# Patient Record
Sex: MALE | Race: WHITE | NOT HISPANIC OR LATINO | URBAN - METROPOLITAN AREA
[De-identification: names, ages, dates, MRNs, and addresses within clinical notes are randomized per-mention and may not be internally consistent; named-entity substitution may affect disease eponyms.]

---

## 2018-12-14 ENCOUNTER — EMERGENCY (EMERGENCY)
Facility: HOSPITAL | Age: 78
LOS: 1 days | Discharge: HOME | End: 2018-12-14
Attending: EMERGENCY MEDICINE | Admitting: EMERGENCY MEDICINE
Payer: MEDICARE

## 2018-12-14 VITALS
OXYGEN SATURATION: 99 % | DIASTOLIC BLOOD PRESSURE: 82 MMHG | HEIGHT: 73 IN | TEMPERATURE: 98 F | SYSTOLIC BLOOD PRESSURE: 171 MMHG | WEIGHT: 244.93 LBS | HEART RATE: 72 BPM | RESPIRATION RATE: 16 BRPM

## 2018-12-14 VITALS
RESPIRATION RATE: 16 BRPM | SYSTOLIC BLOOD PRESSURE: 162 MMHG | OXYGEN SATURATION: 99 % | HEART RATE: 76 BPM | TEMPERATURE: 99 F | DIASTOLIC BLOOD PRESSURE: 79 MMHG

## 2018-12-14 PROCEDURE — 90471 IMMUNIZATION ADMIN: CPT

## 2018-12-14 PROCEDURE — 13133 CMPLX RPR F/C/C/M/N/AX/G/H/F: CPT

## 2018-12-14 PROCEDURE — 99284 EMERGENCY DEPT VISIT MOD MDM: CPT

## 2018-12-14 PROCEDURE — 90715 TDAP VACCINE 7 YRS/> IM: CPT

## 2018-12-14 PROCEDURE — 99285 EMERGENCY DEPT VISIT HI MDM: CPT | Mod: 25

## 2018-12-14 PROCEDURE — 13152 CMPLX RPR E/N/E/L 2.6-7.5 CM: CPT

## 2018-12-14 RX ORDER — TETANUS TOXOID, REDUCED DIPHTHERIA TOXOID AND ACELLULAR PERTUSSIS VACCINE, ADSORBED 5; 2.5; 8; 8; 2.5 [IU]/.5ML; [IU]/.5ML; UG/.5ML; UG/.5ML; UG/.5ML
0.5 SUSPENSION INTRAMUSCULAR ONCE
Qty: 0 | Refills: 0 | Status: COMPLETED | OUTPATIENT
Start: 2018-12-14 | End: 2018-12-14

## 2018-12-14 RX ORDER — OXYCODONE AND ACETAMINOPHEN 5; 325 MG/1; MG/1
1 TABLET ORAL
Qty: 12 | Refills: 0
Start: 2018-12-14 | End: 2018-12-16

## 2018-12-14 RX ADMIN — TETANUS TOXOID, REDUCED DIPHTHERIA TOXOID AND ACELLULAR PERTUSSIS VACCINE, ADSORBED 0.5 MILLILITER(S): 5; 2.5; 8; 8; 2.5 SUSPENSION INTRAMUSCULAR at 17:36

## 2018-12-14 RX ADMIN — Medication 1 TABLET(S): at 19:09

## 2018-12-14 NOTE — ED PROVIDER NOTE - ATTENDING CONTRIBUTION TO CARE
pt c/o laceration to left ear s/p getting of ladder and accidentally hitting ear on ladder. no headache, dizziness, nausea, vomiting, weakness, numbness or any other injury.  left ear approx 2cm laceration to external ear through antihelix and cartilage of ear

## 2018-12-14 NOTE — ED ADULT NURSE NOTE - NSFALLRSKASSESSDT_ED_ALL_ED
You may receive a survey about your visit with us today. The feedback from our patients helps us identify what is working well and where the service to all patients can be enhanced. Thank you!   Soak foot in epsom salt and use a baking soda paste, contact office if erythema spreads
14-Dec-2018 16:36

## 2018-12-14 NOTE — ED PROVIDER NOTE - PROGRESS NOTE DETAILS
plastics consulted due to extensive damage of tissue of ear and cartlage involvment pt seen and evaluated by dr elizabeth, sutured and closed, per dr elizabeth , dc home with augmentin  follow up on wednesday in office or earlier if concern, percocet for pain prn

## 2018-12-14 NOTE — ED PROVIDER NOTE - MEDICAL DECISION MAKING DETAILS
pt with left ear laceration - plastics/tdap pt with left ear laceration - plastics/tdap, augmentin to go home

## 2018-12-14 NOTE — ED PROCEDURE NOTE - PROCEDURE ADDITIONAL DETAILS
devitalize tissue debrided  cartilage repair with 4-0 monocryl  bacitracin ointment applied  augmentin given  shower tomorrow   bacitracin ointment 2-3 x daily   follow up on wednesday

## 2018-12-14 NOTE — ED PROVIDER NOTE - OBJECTIVE STATEMENT
77 y/o male  with mult med problems  presents to the ED with laceration to the left ear x 1 hour  pt states fell from last step of ladder and grabbed ladder when going down,   bottom rung of ladder cause laceration to ear   unsure of last tetanus   denies other complaints or pain

## 2024-03-14 ENCOUNTER — INPATIENT (INPATIENT)
Facility: HOSPITAL | Age: 84
LOS: 1 days | Discharge: HOME | DRG: 309 | End: 2024-03-16
Attending: INTERNAL MEDICINE | Admitting: STUDENT IN AN ORGANIZED HEALTH CARE EDUCATION/TRAINING PROGRAM
Payer: MEDICARE

## 2024-03-14 VITALS
OXYGEN SATURATION: 98 % | DIASTOLIC BLOOD PRESSURE: 79 MMHG | WEIGHT: 231.49 LBS | HEART RATE: 108 BPM | RESPIRATION RATE: 18 BRPM | HEIGHT: 73 IN | TEMPERATURE: 98 F | SYSTOLIC BLOOD PRESSURE: 191 MMHG

## 2024-03-14 LAB
BASOPHILS # BLD AUTO: 0.02 K/UL — SIGNIFICANT CHANGE UP (ref 0–0.2)
BASOPHILS NFR BLD AUTO: 0.4 % — SIGNIFICANT CHANGE UP (ref 0–2)
EOSINOPHIL # BLD AUTO: 0.09 K/UL — SIGNIFICANT CHANGE UP (ref 0–0.5)
EOSINOPHIL NFR BLD AUTO: 1.8 % — SIGNIFICANT CHANGE UP (ref 0–6)
HCT VFR BLD CALC: 28.8 % — LOW (ref 39–50)
HGB BLD-MCNC: 9.5 G/DL — LOW (ref 13–17)
IMM GRANULOCYTES NFR BLD AUTO: 0.4 % — SIGNIFICANT CHANGE UP (ref 0–0.9)
LYMPHOCYTES # BLD AUTO: 1.31 K/UL — SIGNIFICANT CHANGE UP (ref 1–3.3)
LYMPHOCYTES # BLD AUTO: 26.1 % — SIGNIFICANT CHANGE UP (ref 13–44)
MCHC RBC-ENTMCNC: 28.5 PG — SIGNIFICANT CHANGE UP (ref 27–34)
MCHC RBC-ENTMCNC: 33 GM/DL — SIGNIFICANT CHANGE UP (ref 32–36)
MCV RBC AUTO: 86.5 FL — SIGNIFICANT CHANGE UP (ref 80–100)
MONOCYTES # BLD AUTO: 0.7 K/UL — SIGNIFICANT CHANGE UP (ref 0–0.9)
MONOCYTES NFR BLD AUTO: 13.9 % — SIGNIFICANT CHANGE UP (ref 2–14)
NEUTROPHILS # BLD AUTO: 2.88 K/UL — SIGNIFICANT CHANGE UP (ref 1.8–7.4)
NEUTROPHILS NFR BLD AUTO: 57.4 % — SIGNIFICANT CHANGE UP (ref 43–77)
NRBC # BLD: 0 /100 WBCS — SIGNIFICANT CHANGE UP (ref 0–0)
PLATELET # BLD AUTO: 167 K/UL — SIGNIFICANT CHANGE UP (ref 150–400)
RBC # BLD: 3.33 M/UL — LOW (ref 4.2–5.8)
RBC # FLD: 16 % — HIGH (ref 10.3–14.5)
WBC # BLD: 5.02 K/UL — SIGNIFICANT CHANGE UP (ref 3.8–10.5)
WBC # FLD AUTO: 5.02 K/UL — SIGNIFICANT CHANGE UP (ref 3.8–10.5)

## 2024-03-14 PROCEDURE — 99285 EMERGENCY DEPT VISIT HI MDM: CPT

## 2024-03-14 PROCEDURE — 93010 ELECTROCARDIOGRAM REPORT: CPT

## 2024-03-14 RX ORDER — CARVEDILOL PHOSPHATE 80 MG/1
25 CAPSULE, EXTENDED RELEASE ORAL ONCE
Refills: 0 | Status: COMPLETED | OUTPATIENT
Start: 2024-03-14 | End: 2024-03-14

## 2024-03-14 NOTE — ED PROVIDER NOTE - NSICDXPASTMEDICALHX_GEN_ALL_CORE_FT
PAST MEDICAL HISTORY:  Atrial fibrillation     Bladder cancer     HTN (hypertension)     Hypercholesterolemia     Pacemaker

## 2024-03-14 NOTE — ED ADULT TRIAGE NOTE - HEART RATE (BEATS/MIN)
108 Wartpeel Counseling:  I discussed with the patient the risks of Wartpeel including but not limited to erythema, scaling, itching, weeping, crusting, and pain.

## 2024-03-14 NOTE — ED PROVIDER NOTE - CLINICAL SUMMARY MEDICAL DECISION MAKING FREE TEXT BOX
84 y/o M PMH of Afib on Eliquis, SSS s/p PPM, HTN, HLD, Bladder Ca on Keytruda presenting with palpitations  EKG V-paced with occasional native beats  Will dose PM carvedilol  Check screening labs including cardiac enzymes, TSH, Digoxin level  Doubt PE as patient on Eliquis  Reassess. 84 y/o M PMH of Afib on Eliquis, SSS s/p PPM, HTN, HLD, Bladder Ca on Keytruda, Hypothyroidism presenting with palpitations  EKG V-paced with occasional native beats  Will dose PM carvedilol  Check screening labs including cardiac enzymes, TSH, Digoxin level  Doubt PE as patient on Eliquis  Reassess. 84 y/o M PMH of Afib on Eliquis, SSS s/p PPM, HTN, HLD, Bladder Ca on Keytruda, Hypothyroidism, CAD, HFrEF presenting with palpitations  EKG V-paced with occasional native beats  Will dose PM carvedilol  Check screening labs including cardiac enzymes, TSH, Digoxin level  Doubt PE as patient on Eliquis  Reassess.

## 2024-03-14 NOTE — ED PROVIDER NOTE - OBJECTIVE STATEMENT
82 y/o M PMH of Afib on Eliquis, SSS s/p PPM, HTN, HLD, Bladder Ca on Keytruda presenting with palpitations    States that palpitations occurred around 930 with associated nausea and diaphoresis but no chest pain. No fevers/chills or shortness of breath. Received second infusion of Keytruda last Thursday. No vomiting. Since arriving in ER, patient feels that HR has improved. Takes carvedilol 25 mg BID for rate control, but did not take PM dose yet.  Cardiologist: Dr. Beck 84 y/o M PMH of Afib on Eliquis, SSS s/p PPM, HTN, HLD, Bladder Ca on Keytruda, Hypothyroidism presenting with palpitations    States that palpitations occurred around 930 with associated nausea and diaphoresis but no chest pain. No fevers/chills or shortness of breath. Received second infusion of Keytruda last Thursday. No vomiting. Since arriving in ER, patient feels that HR has improved. Takes carvedilol 25 mg BID for rate control, but did not take PM dose yet.  Cardiologist: Dr. Beck 82 y/o M PMH of Afib on Eliquis, SSS s/p PPM, HTN, HLD, Bladder Ca on Keytruda, Hypothyroidism, CAD, HFrEF presenting with palpitations    States that palpitations occurred around 930 with associated nausea and diaphoresis but no chest pain. No fevers/chills or shortness of breath. Received second infusion of Keytruda last Thursday. No vomiting. Since arriving in ER, patient feels that HR has improved. Takes carvedilol 25 mg BID for rate control, but did not take PM dose yet.  Cardiologist: Dr. Beck

## 2024-03-14 NOTE — ED PROVIDER NOTE - INTERPRETATION
occasional native beats/pacemaker: good capture, regular rhythm and appropriate intervals. Afib with V-pacing intermittently/abnormal

## 2024-03-15 DIAGNOSIS — Z29.9 ENCOUNTER FOR PROPHYLACTIC MEASURES, UNSPECIFIED: ICD-10-CM

## 2024-03-15 DIAGNOSIS — I48.91 UNSPECIFIED ATRIAL FIBRILLATION: ICD-10-CM

## 2024-03-15 DIAGNOSIS — E03.9 HYPOTHYROIDISM, UNSPECIFIED: ICD-10-CM

## 2024-03-15 DIAGNOSIS — I25.10 ATHEROSCLEROTIC HEART DISEASE OF NATIVE CORONARY ARTERY WITHOUT ANGINA PECTORIS: ICD-10-CM

## 2024-03-15 DIAGNOSIS — R79.89 OTHER SPECIFIED ABNORMAL FINDINGS OF BLOOD CHEMISTRY: ICD-10-CM

## 2024-03-15 DIAGNOSIS — I50.9 HEART FAILURE, UNSPECIFIED: ICD-10-CM

## 2024-03-15 DIAGNOSIS — I10 ESSENTIAL (PRIMARY) HYPERTENSION: ICD-10-CM

## 2024-03-15 DIAGNOSIS — R00.2 PALPITATIONS: ICD-10-CM

## 2024-03-15 DIAGNOSIS — Z87.438 PERSONAL HISTORY OF OTHER DISEASES OF MALE GENITAL ORGANS: ICD-10-CM

## 2024-03-15 DIAGNOSIS — N17.9 ACUTE KIDNEY FAILURE, UNSPECIFIED: ICD-10-CM

## 2024-03-15 DIAGNOSIS — K21.9 GASTRO-ESOPHAGEAL REFLUX DISEASE WITHOUT ESOPHAGITIS: ICD-10-CM

## 2024-03-15 DIAGNOSIS — C67.9 MALIGNANT NEOPLASM OF BLADDER, UNSPECIFIED: ICD-10-CM

## 2024-03-15 PROBLEM — Z95.0 PRESENCE OF CARDIAC PACEMAKER: Chronic | Status: ACTIVE | Noted: 2018-12-14

## 2024-03-15 PROBLEM — E78.00 PURE HYPERCHOLESTEROLEMIA, UNSPECIFIED: Chronic | Status: ACTIVE | Noted: 2018-12-14

## 2024-03-15 LAB
A1C WITH ESTIMATED AVERAGE GLUCOSE RESULT: 5.7 % — HIGH (ref 4–5.6)
ALBUMIN SERPL ELPH-MCNC: 3 G/DL — LOW (ref 3.3–5)
ALBUMIN SERPL ELPH-MCNC: 3.3 G/DL — SIGNIFICANT CHANGE UP (ref 3.3–5)
ALP SERPL-CCNC: 56 U/L — SIGNIFICANT CHANGE UP (ref 40–120)
ALP SERPL-CCNC: 63 U/L — SIGNIFICANT CHANGE UP (ref 40–120)
ALT FLD-CCNC: 15 U/L — SIGNIFICANT CHANGE UP (ref 12–78)
ALT FLD-CCNC: 15 U/L — SIGNIFICANT CHANGE UP (ref 12–78)
ANION GAP SERPL CALC-SCNC: 10 MMOL/L — SIGNIFICANT CHANGE UP (ref 5–17)
ANION GAP SERPL CALC-SCNC: 9 MMOL/L — SIGNIFICANT CHANGE UP (ref 5–17)
APPEARANCE UR: CLEAR — SIGNIFICANT CHANGE UP
APTT BLD: 34.7 SEC — SIGNIFICANT CHANGE UP (ref 24.5–35.6)
AST SERPL-CCNC: 13 U/L — LOW (ref 15–37)
AST SERPL-CCNC: 21 U/L — SIGNIFICANT CHANGE UP (ref 15–37)
BILIRUB SERPL-MCNC: 0.2 MG/DL — SIGNIFICANT CHANGE UP (ref 0.2–1.2)
BILIRUB SERPL-MCNC: 0.3 MG/DL — SIGNIFICANT CHANGE UP (ref 0.2–1.2)
BILIRUB UR-MCNC: NEGATIVE — SIGNIFICANT CHANGE UP
BUN SERPL-MCNC: 63 MG/DL — HIGH (ref 7–23)
BUN SERPL-MCNC: 67 MG/DL — HIGH (ref 7–23)
CALCIUM SERPL-MCNC: 9 MG/DL — SIGNIFICANT CHANGE UP (ref 8.5–10.1)
CALCIUM SERPL-MCNC: 9 MG/DL — SIGNIFICANT CHANGE UP (ref 8.5–10.1)
CHLORIDE SERPL-SCNC: 107 MMOL/L — SIGNIFICANT CHANGE UP (ref 96–108)
CHLORIDE SERPL-SCNC: 112 MMOL/L — HIGH (ref 96–108)
CHOLEST SERPL-MCNC: 100 MG/DL — SIGNIFICANT CHANGE UP
CK MB BLD-MCNC: 3.5 % — SIGNIFICANT CHANGE UP (ref 0–3.5)
CK MB BLD-MCNC: 4.1 % — HIGH (ref 0–3.5)
CK MB CFR SERPL CALC: 1.7 NG/ML — SIGNIFICANT CHANGE UP (ref 0–3.6)
CK MB CFR SERPL CALC: 2.2 NG/ML — SIGNIFICANT CHANGE UP (ref 0–3.6)
CK SERPL-CCNC: 48 U/L — SIGNIFICANT CHANGE UP (ref 26–308)
CK SERPL-CCNC: 54 U/L — SIGNIFICANT CHANGE UP (ref 26–308)
CO2 SERPL-SCNC: 24 MMOL/L — SIGNIFICANT CHANGE UP (ref 22–31)
CO2 SERPL-SCNC: 25 MMOL/L — SIGNIFICANT CHANGE UP (ref 22–31)
COLOR SPEC: YELLOW — SIGNIFICANT CHANGE UP
CREAT SERPL-MCNC: 2.2 MG/DL — HIGH (ref 0.5–1.3)
CREAT SERPL-MCNC: 2.2 MG/DL — HIGH (ref 0.5–1.3)
DIFF PNL FLD: ABNORMAL
DIGOXIN SERPL-MCNC: 0.4 NG/ML — LOW (ref 0.8–2)
EGFR: 29 ML/MIN/1.73M2 — LOW
EGFR: 29 ML/MIN/1.73M2 — LOW
ESTIMATED AVERAGE GLUCOSE: 117 MG/DL — HIGH (ref 68–114)
GLUCOSE SERPL-MCNC: 90 MG/DL — SIGNIFICANT CHANGE UP (ref 70–99)
GLUCOSE SERPL-MCNC: 99 MG/DL — SIGNIFICANT CHANGE UP (ref 70–99)
GLUCOSE UR QL: NEGATIVE MG/DL — SIGNIFICANT CHANGE UP
HCT VFR BLD CALC: 26.8 % — LOW (ref 39–50)
HDLC SERPL-MCNC: 40 MG/DL — LOW
HGB BLD-MCNC: 8.7 G/DL — LOW (ref 13–17)
INR BLD: 1.19 RATIO — HIGH (ref 0.85–1.18)
KETONES UR-MCNC: NEGATIVE MG/DL — SIGNIFICANT CHANGE UP
LEUKOCYTE ESTERASE UR-ACNC: ABNORMAL
LIPID PNL WITH DIRECT LDL SERPL: 43 MG/DL — SIGNIFICANT CHANGE UP
MAGNESIUM SERPL-MCNC: 2 MG/DL — SIGNIFICANT CHANGE UP (ref 1.6–2.6)
MCHC RBC-ENTMCNC: 28.2 PG — SIGNIFICANT CHANGE UP (ref 27–34)
MCHC RBC-ENTMCNC: 32.5 GM/DL — SIGNIFICANT CHANGE UP (ref 32–36)
MCV RBC AUTO: 87 FL — SIGNIFICANT CHANGE UP (ref 80–100)
NITRITE UR-MCNC: NEGATIVE — SIGNIFICANT CHANGE UP
NON HDL CHOLESTEROL: 60 MG/DL — SIGNIFICANT CHANGE UP
NRBC # BLD: 0 /100 WBCS — SIGNIFICANT CHANGE UP (ref 0–0)
PH UR: 5 — SIGNIFICANT CHANGE UP (ref 5–8)
PHOSPHATE SERPL-MCNC: 3.6 MG/DL — SIGNIFICANT CHANGE UP (ref 2.5–4.5)
PLATELET # BLD AUTO: 147 K/UL — LOW (ref 150–400)
POTASSIUM SERPL-MCNC: 4.3 MMOL/L — SIGNIFICANT CHANGE UP (ref 3.5–5.3)
POTASSIUM SERPL-MCNC: 4.4 MMOL/L — SIGNIFICANT CHANGE UP (ref 3.5–5.3)
POTASSIUM SERPL-SCNC: 4.3 MMOL/L — SIGNIFICANT CHANGE UP (ref 3.5–5.3)
POTASSIUM SERPL-SCNC: 4.4 MMOL/L — SIGNIFICANT CHANGE UP (ref 3.5–5.3)
PROT SERPL-MCNC: 6.5 G/DL — SIGNIFICANT CHANGE UP (ref 6–8.3)
PROT SERPL-MCNC: 7.1 G/DL — SIGNIFICANT CHANGE UP (ref 6–8.3)
PROT UR-MCNC: NEGATIVE MG/DL — SIGNIFICANT CHANGE UP
PROTHROM AB SERPL-ACNC: 13.8 SEC — HIGH (ref 9.5–13)
RBC # BLD: 3.08 M/UL — LOW (ref 4.2–5.8)
RBC # FLD: 16.1 % — HIGH (ref 10.3–14.5)
SODIUM SERPL-SCNC: 141 MMOL/L — SIGNIFICANT CHANGE UP (ref 135–145)
SODIUM SERPL-SCNC: 146 MMOL/L — HIGH (ref 135–145)
SP GR SPEC: 1.01 — SIGNIFICANT CHANGE UP (ref 1–1.03)
T3FREE SERPL-MCNC: 3.6 PG/ML — SIGNIFICANT CHANGE UP (ref 2–4.4)
TRIGL SERPL-MCNC: 87 MG/DL — SIGNIFICANT CHANGE UP
TROPONIN I, HIGH SENSITIVITY RESULT: 225.7 NG/L — HIGH
TROPONIN I, HIGH SENSITIVITY RESULT: 272 NG/L — HIGH
TROPONIN I, HIGH SENSITIVITY RESULT: 292.5 NG/L — HIGH
TSH SERPL-MCNC: 0.02 UIU/ML — LOW (ref 0.36–3.74)
UROBILINOGEN FLD QL: 0.2 MG/DL — SIGNIFICANT CHANGE UP (ref 0.2–1)
WBC # BLD: 5.45 K/UL — SIGNIFICANT CHANGE UP (ref 3.8–10.5)
WBC # FLD AUTO: 5.45 K/UL — SIGNIFICANT CHANGE UP (ref 3.8–10.5)

## 2024-03-15 PROCEDURE — 99223 1ST HOSP IP/OBS HIGH 75: CPT

## 2024-03-15 RX ORDER — POTASSIUM BICARBONATE 978 MG/1
1 TABLET, EFFERVESCENT ORAL
Refills: 0 | DISCHARGE

## 2024-03-15 RX ORDER — DIGOXIN 250 MCG
0 TABLET ORAL
Qty: 0 | Refills: 0 | DISCHARGE

## 2024-03-15 RX ORDER — ROSUVASTATIN CALCIUM 5 MG/1
1 TABLET ORAL
Qty: 0 | Refills: 0 | DISCHARGE

## 2024-03-15 RX ORDER — RANITIDINE HYDROCHLORIDE 150 MG/1
0 TABLET, FILM COATED ORAL
Qty: 0 | Refills: 0 | DISCHARGE

## 2024-03-15 RX ORDER — SODIUM CHLORIDE 9 MG/ML
1000 INJECTION, SOLUTION INTRAVENOUS
Refills: 0 | Status: DISCONTINUED | OUTPATIENT
Start: 2024-03-15 | End: 2024-03-16

## 2024-03-15 RX ORDER — AMLODIPINE BESYLATE 2.5 MG/1
10 TABLET ORAL DAILY
Refills: 0 | Status: DISCONTINUED | OUTPATIENT
Start: 2024-03-15 | End: 2024-03-15

## 2024-03-15 RX ORDER — EZETIMIBE 10 MG/1
1 TABLET ORAL
Qty: 0 | Refills: 0 | DISCHARGE

## 2024-03-15 RX ORDER — CARVEDILOL PHOSPHATE 80 MG/1
6.25 CAPSULE, EXTENDED RELEASE ORAL EVERY 12 HOURS
Refills: 0 | Status: DISCONTINUED | OUTPATIENT
Start: 2024-03-15 | End: 2024-03-15

## 2024-03-15 RX ORDER — ASPIRIN/CALCIUM CARB/MAGNESIUM 324 MG
1 TABLET ORAL
Refills: 0 | DISCHARGE

## 2024-03-15 RX ORDER — APIXABAN 2.5 MG/1
0 TABLET, FILM COATED ORAL
Qty: 0 | Refills: 0 | DISCHARGE

## 2024-03-15 RX ORDER — CARVEDILOL PHOSPHATE 80 MG/1
25 CAPSULE, EXTENDED RELEASE ORAL EVERY 12 HOURS
Refills: 0 | Status: DISCONTINUED | OUTPATIENT
Start: 2024-03-15 | End: 2024-03-16

## 2024-03-15 RX ORDER — CALCITRIOL 0.5 UG/1
1 CAPSULE ORAL
Qty: 0 | Refills: 0 | DISCHARGE

## 2024-03-15 RX ORDER — AMIODARONE HYDROCHLORIDE 400 MG/1
2 TABLET ORAL
Refills: 0 | DISCHARGE

## 2024-03-15 RX ORDER — FINASTERIDE 5 MG/1
1 TABLET, FILM COATED ORAL
Refills: 0 | DISCHARGE

## 2024-03-15 RX ORDER — ASPIRIN/CALCIUM CARB/MAGNESIUM 324 MG
1 TABLET ORAL
Qty: 0 | Refills: 0 | DISCHARGE

## 2024-03-15 RX ORDER — LEVOTHYROXINE SODIUM 125 MCG
100 TABLET ORAL DAILY
Refills: 0 | Status: DISCONTINUED | OUTPATIENT
Start: 2024-03-15 | End: 2024-03-16

## 2024-03-15 RX ORDER — DIGOXIN 250 MCG
125 TABLET ORAL
Refills: 0 | Status: DISCONTINUED | OUTPATIENT
Start: 2024-03-15 | End: 2024-03-16

## 2024-03-15 RX ORDER — AMIODARONE HYDROCHLORIDE 400 MG/1
100 TABLET ORAL DAILY
Refills: 0 | Status: DISCONTINUED | OUTPATIENT
Start: 2024-03-15 | End: 2024-03-16

## 2024-03-15 RX ORDER — PEMBROLIZUMAB 25 MG/ML
200 INJECTION, SOLUTION INTRAVENOUS
Refills: 0 | DISCHARGE

## 2024-03-15 RX ORDER — LEVOTHYROXINE SODIUM 125 MCG
1 TABLET ORAL
Qty: 0 | Refills: 0 | DISCHARGE

## 2024-03-15 RX ORDER — ASPIRIN/CALCIUM CARB/MAGNESIUM 324 MG
324 TABLET ORAL ONCE
Refills: 0 | Status: COMPLETED | OUTPATIENT
Start: 2024-03-15 | End: 2024-03-15

## 2024-03-15 RX ORDER — AMIODARONE HYDROCHLORIDE 400 MG/1
1 TABLET ORAL
Refills: 0 | DISCHARGE

## 2024-03-15 RX ORDER — HYDROCHLOROTHIAZIDE 25 MG
0 TABLET ORAL
Qty: 0 | Refills: 0 | DISCHARGE

## 2024-03-15 RX ORDER — FUROSEMIDE 40 MG
1 TABLET ORAL
Refills: 0 | DISCHARGE

## 2024-03-15 RX ORDER — ASPIRIN/CALCIUM CARB/MAGNESIUM 324 MG
81 TABLET ORAL DAILY
Refills: 0 | Status: DISCONTINUED | OUTPATIENT
Start: 2024-03-15 | End: 2024-03-16

## 2024-03-15 RX ORDER — CARVEDILOL PHOSPHATE 80 MG/1
1 CAPSULE, EXTENDED RELEASE ORAL
Refills: 0 | DISCHARGE

## 2024-03-15 RX ORDER — SACUBITRIL AND VALSARTAN 24; 26 MG/1; MG/1
1 TABLET, FILM COATED ORAL
Refills: 0 | DISCHARGE

## 2024-03-15 RX ORDER — AMLODIPINE BESYLATE 2.5 MG/1
10 TABLET ORAL DAILY
Refills: 0 | Status: DISCONTINUED | OUTPATIENT
Start: 2024-03-15 | End: 2024-03-16

## 2024-03-15 RX ORDER — PANTOPRAZOLE SODIUM 20 MG/1
40 TABLET, DELAYED RELEASE ORAL
Refills: 0 | Status: DISCONTINUED | OUTPATIENT
Start: 2024-03-15 | End: 2024-03-16

## 2024-03-15 RX ORDER — CALCITRIOL 0.5 UG/1
0.25 CAPSULE ORAL DAILY
Refills: 0 | Status: DISCONTINUED | OUTPATIENT
Start: 2024-03-15 | End: 2024-03-16

## 2024-03-15 RX ORDER — ATORVASTATIN CALCIUM 80 MG/1
40 TABLET, FILM COATED ORAL AT BEDTIME
Refills: 0 | Status: DISCONTINUED | OUTPATIENT
Start: 2024-03-15 | End: 2024-03-16

## 2024-03-15 RX ORDER — APIXABAN 2.5 MG/1
5 TABLET, FILM COATED ORAL
Refills: 0 | Status: DISCONTINUED | OUTPATIENT
Start: 2024-03-15 | End: 2024-03-16

## 2024-03-15 RX ORDER — BENAZEPRIL HYDROCHLORIDE 40 MG/1
1 TABLET ORAL
Qty: 0 | Refills: 0 | DISCHARGE

## 2024-03-15 RX ORDER — APIXABAN 2.5 MG/1
1 TABLET, FILM COATED ORAL
Refills: 0 | DISCHARGE

## 2024-03-15 RX ORDER — DIGOXIN 250 MCG
1 TABLET ORAL
Refills: 0 | DISCHARGE

## 2024-03-15 RX ORDER — CARVEDILOL PHOSPHATE 80 MG/1
0 CAPSULE, EXTENDED RELEASE ORAL
Qty: 0 | Refills: 0 | DISCHARGE

## 2024-03-15 RX ORDER — CALCITRIOL 0.5 UG/1
1 CAPSULE ORAL
Refills: 0 | DISCHARGE

## 2024-03-15 RX ORDER — ESOMEPRAZOLE MAGNESIUM 40 MG/1
1 CAPSULE, DELAYED RELEASE ORAL
Refills: 0 | DISCHARGE

## 2024-03-15 RX ORDER — AMLODIPINE BESYLATE 2.5 MG/1
1 TABLET ORAL
Qty: 0 | Refills: 0 | DISCHARGE

## 2024-03-15 RX ORDER — AMIODARONE HYDROCHLORIDE 400 MG/1
1 TABLET ORAL
Qty: 0 | Refills: 0 | DISCHARGE

## 2024-03-15 RX ORDER — FINASTERIDE 5 MG/1
5 TABLET, FILM COATED ORAL DAILY
Refills: 0 | Status: DISCONTINUED | OUTPATIENT
Start: 2024-03-15 | End: 2024-03-16

## 2024-03-15 RX ADMIN — AMIODARONE HYDROCHLORIDE 100 MILLIGRAM(S): 400 TABLET ORAL at 06:07

## 2024-03-15 RX ADMIN — APIXABAN 5 MILLIGRAM(S): 2.5 TABLET, FILM COATED ORAL at 06:07

## 2024-03-15 RX ADMIN — CALCITRIOL 0.25 MICROGRAM(S): 0.5 CAPSULE ORAL at 12:26

## 2024-03-15 RX ADMIN — Medication 81 MILLIGRAM(S): at 12:26

## 2024-03-15 RX ADMIN — Medication 324 MILLIGRAM(S): at 00:27

## 2024-03-15 RX ADMIN — SODIUM CHLORIDE 75 MILLILITER(S): 9 INJECTION, SOLUTION INTRAVENOUS at 17:16

## 2024-03-15 RX ADMIN — PANTOPRAZOLE SODIUM 40 MILLIGRAM(S): 20 TABLET, DELAYED RELEASE ORAL at 12:26

## 2024-03-15 RX ADMIN — CARVEDILOL PHOSPHATE 25 MILLIGRAM(S): 80 CAPSULE, EXTENDED RELEASE ORAL at 17:15

## 2024-03-15 RX ADMIN — CARVEDILOL PHOSPHATE 25 MILLIGRAM(S): 80 CAPSULE, EXTENDED RELEASE ORAL at 06:06

## 2024-03-15 RX ADMIN — AMLODIPINE BESYLATE 10 MILLIGRAM(S): 2.5 TABLET ORAL at 06:08

## 2024-03-15 RX ADMIN — ATORVASTATIN CALCIUM 40 MILLIGRAM(S): 80 TABLET, FILM COATED ORAL at 21:10

## 2024-03-15 RX ADMIN — Medication 100 MICROGRAM(S): at 06:07

## 2024-03-15 RX ADMIN — APIXABAN 5 MILLIGRAM(S): 2.5 TABLET, FILM COATED ORAL at 17:15

## 2024-03-15 RX ADMIN — CARVEDILOL PHOSPHATE 25 MILLIGRAM(S): 80 CAPSULE, EXTENDED RELEASE ORAL at 00:12

## 2024-03-15 RX ADMIN — Medication 125 MICROGRAM(S): at 06:26

## 2024-03-15 RX ADMIN — FINASTERIDE 5 MILLIGRAM(S): 5 TABLET, FILM COATED ORAL at 12:26

## 2024-03-15 NOTE — CARE COORDINATION ASSESSMENT. - NSDCPLANSERVICES_GEN_ALL_CORE
CM spoke with patient to discuss home care agency choices. Patient undecided at this time. Resource folder left at bedside. CM to follow up./Anticipated Needs Unclear at Present/[facility], Home

## 2024-03-15 NOTE — CONSULT NOTE ADULT - ASSESSMENT
SHARRON on CKD 3  Hypernatremia  HTN  Afib     -Baseline Creatinine 1.8 per patient  -SHARRON likely 2/2 dehydration, accidentally took extra dose of lasix   -Check urine lytes  -Check UA  -Start gentle hypotonic IVF  -Check mag/phos in AM  -Hold lasix for now    d/w primary    3/15/24-D/w wife

## 2024-03-15 NOTE — DISCHARGE NOTE PROVIDER - HOSPITAL COURSE
HPI:  84 yo M w/ PMHX of NSTEMI, RCA stent, LV systolic dysfunction, labile hypertension, PAF, PPM and h/o AAA repair w/ repair in sept 2019, bladdercancer on Keytruda, Hypothyroidism, HLD presents to the ED w/ palpitations that started at 9pm last night. He also admitted to feeling nauseous at the same time he was having palpitations. He presented to the hospital and his HR was noted to be in the 100s. EKG showed ventricular paced with HR of 70 and trops were elevated but ekg showed no ischemic changes and denies chest pain.     In the ED,   Vitals: T: 97.9, HR: 108, BP: 191/79, RR: 18, O2: 98% on RA   EKG: Ventricular Paced HR 70 bpm   Significant labs: Hgb: 9.5, INR: 1.19, Trops 292-->272, Cr/BUN: 67/2.20, CPK: 4.1, Digoxin: 0.4   Imaging: None   Given: ASA 324mg PO x1, Coreg 25mg PO OD  (15 Mar 2024 01:34)      ---  HOSPITAL COURSE: Patient admitted to medicine floor for management of arrythmia workup.    Trops were trended to peak.     Pt seen and examined on day of discharge. Patient is medically optimized for discharge to home with close outpatient followup.    PHYSICAL EXAM ON DAY OF DISCHARGE:  The patient was seen and examined on the day of discharge. Please see progress note from day of discharge for further information.         ---  CONSULTANTS:   Cards: Ebony    ---  TIME SPENT:  I, the attending physician, was physically present for the key portions of the evaluation and management (E/M) service provided. The total amount of time spent reviewing the hospital notes, laboratory values, imaging findings, assessing/counseling the patient, discussing with consultant physicians, social work, nursing staff was -- minutes    ---  Primary care provider was made aware of plan for discharge:      [  ] NO     [  ] YES   HPI:  82 yo M w/ PMHX of NSTEMI, RCA stent, LV systolic dysfunction, labile hypertension, PAF, PPM and h/o AAA repair w/ repair in sept 2019, bladdercancer on Keytruda, Hypothyroidism, HLD presents to the ED w/ palpitations that started at 9pm last night. He also admitted to feeling nauseous at the same time he was having palpitations. He presented to the hospital and his HR was noted to be in the 100s. EKG showed ventricular paced with HR of 70 and trops were elevated but ekg showed no ischemic changes and denies chest pain.     In the ED,   Vitals: T: 97.9, HR: 108, BP: 191/79, RR: 18, O2: 98% on RA   EKG: Ventricular Paced HR 70 bpm   Significant labs: Hgb: 9.5, INR: 1.19, Trops 292-->272, Cr/BUN: 67/2.20, CPK: 4.1, Digoxin: 0.4   Imaging: None   Given: ASA 324mg PO x1, Coreg 25mg PO OD  (15 Mar 2024 01:34)      ---  HOSPITAL COURSE: Patient admitted to medicine floor for management of arrythmia workup. Cardiology consulted. PPM interrogated to be functioning normally without recorded VTach events. TSH was noted to be low, T4 elevated, T3 normal. Endocrinology was consulted and suggested thyrotoxicosis from levothyroxine or amiodarone-induced thyroid dysfunction.     Trops were trended to peak.     Pt seen and examined on day of discharge. Patient is medically optimized for discharge to home with close outpatient followup.    PHYSICAL EXAM ON DAY OF DISCHARGE:  The patient was seen and examined on the day of discharge. Please see progress note from day of discharge for further information.         ---  CONSULTANTS:   Cards: Ebony    ---  TIME SPENT:  I, the attending physician, was physically present for the key portions of the evaluation and management (E/M) service provided. The total amount of time spent reviewing the hospital notes, laboratory values, imaging findings, assessing/counseling the patient, discussing with consultant physicians, social work, nursing staff was -- minutes    ---  Primary care provider was made aware of plan for discharge:      [  ] NO     [  ] YES   HPI:  84 yo M w/ PMHX of NSTEMI, RCA stent, LV systolic dysfunction, labile hypertension, PAF, PPM and h/o AAA repair w/ repair in sept 2019, bladdercancer on Keytruda, Hypothyroidism, HLD presents to the ED w/ palpitations that started at 9pm last night. He also admitted to feeling nauseous at the same time he was having palpitations. He presented to the hospital and his HR was noted to be in the 100s. EKG showed ventricular paced with HR of 70 and trops were elevated but ekg showed no ischemic changes and denies chest pain.     In the ED,   Vitals: T: 97.9, HR: 108, BP: 191/79, RR: 18, O2: 98% on RA   EKG: Ventricular Paced HR 70 bpm   Significant labs: Hgb: 9.5, INR: 1.19, Trops 292-->272, Cr/BUN: 67/2.20, CPK: 4.1, Digoxin: 0.4   Imaging: None   Given: ASA 324mg PO x1, Coreg 25mg PO OD  (15 Mar 2024 01:34)      ---  HOSPITAL COURSE: Patient admitted to medicine floor for management of arrythmia workup. Cardiology consulted. PPM interrogated to be functioning normally without recorded VTach events. Trops were trended to peak, likely elevated 2/2 demand ischemia from Afib.  TSH was noted to be low, T4 elevated, T3 normal. Endocrinology was consulted and believed this was thyrotoxicosis from levothyroxine or amiodarone. Levothyroxine was discontinued, and he was advised to follow-up outpatient. Cardiology recommended discontinuing Amiodarone but patient wants to discuss with outpatient Cardiologist, Dr. Ebony chino.     Pt seen and examined on day of discharge. Patient is medically optimized for discharge to home with close outpatient followup.    PHYSICAL EXAM ON DAY OF DISCHARGE:    T(C): 36.8 (03-16-24 @ 11:42), Max: 36.8 (03-16-24 @ 11:42)  HR: 68 (03-16-24 @ 11:42) (68 - 73)  BP: 160/54 (03-16-24 @ 11:42) (157/77 - 177/71)  RR: 18 (03-16-24 @ 11:42) (18 - 18)  SpO2: 95% (03-16-24 @ 11:42) (92% - 95%)  Patient On (Oxygen Delivery Method): room air    GENERAL: patient appears well, no acute distress  EYES: sclera clear, no exudates  ENMT: oropharynx clear without erythema, no exudates, moist mucous membranes  NECK: supple, soft, no thyromegaly noted  LUNGS: good air entry bilaterally, clear to auscultation, symmetric breath sounds, no wheezing or rhonchi appreciated  HEART: +irregular rhythm, S1/S2, regular rate, no murmurs noted, no lower extremity edema  GASTROINTESTINAL: abdomen is soft, nontender, nondistended, normoactive bowel sounds, no palpable masses  INTEGUMENT: good skin turgor, no lesions noted  MUSCULOSKELETAL: no clubbing or cyanosis, no obvious deformity  NEUROLOGIC: awake, alert, oriented x3, good muscle tone in 4 extremities, no obvious sensory deficits      ---  CONSULTANTS:   Cardiology: Dr. Blevins   Nephrology: Dr. Shaikh  Endocrinology: Dr. Perlman    ---  TIME SPENT:  I, the attending physician, was physically present for the key portions of the evaluation and management (E/M) service provided. The total amount of time spent reviewing the hospital notes, laboratory values, imaging findings, assessing/counseling the patient, discussing with consultant physicians, social work, nursing staff was -- minutes    ---  Primary care provider was made aware of plan for discharge:      [  ] NO     [  ] YES   HPI:  84 yo M w/ PMHX of NSTEMI, RCA stent, LV systolic dysfunction, labile hypertension, PAF, PPM and h/o AAA repair w/ repair in sept 2019, bladdercancer on Keytruda, Hypothyroidism, HLD presents to the ED w/ palpitations that started at 9pm last night. He also admitted to feeling nauseous at the same time he was having palpitations. He presented to the hospital and his HR was noted to be in the 100s. EKG showed ventricular paced with HR of 70 and trops were elevated but ekg showed no ischemic changes and denies chest pain.     In the ED,   Vitals: T: 97.9, HR: 108, BP: 191/79, RR: 18, O2: 98% on RA   EKG: Ventricular Paced HR 70 bpm   Significant labs: Hgb: 9.5, INR: 1.19, Trops 292-->272, Cr/BUN: 67/2.20, CPK: 4.1, Digoxin: 0.4   Imaging: None   Given: ASA 324mg PO x1, Coreg 25mg PO OD  (15 Mar 2024 01:34)      ---  HOSPITAL COURSE: Patient admitted to medicine floor for management of arrythmia workup. Cardiology consulted. PPM interrogated to be functioning normally without recorded VTach events. Trops were trended to peak, likely elevated 2/2 demand ischemia from Afib.  TSH was noted to be low, T4 elevated, T3 normal. Endocrinology was consulted and believed this was thyrotoxicosis from levothyroxine or amiodarone. Levothyroxine was discontinued, and he was advised to follow-up outpatient. Cardiology recommended discontinuing Amiodarone but patient wants to discuss with outpatient Cardiologist, Dr. Ebony chino. Call was placed to covering cardio service 3/16 regarding TTE report, awaiting call back. Patient preferred to go home prior to the TTE results and follow up for this and further management with his outpatient Cardiologist.    Pt seen and examined on day of discharge. Patient is medically optimized for discharge to home with close outpatient followup.    ---  CONSULTANTS:   Cardiology: Dr. Belvins   Nephrology: Dr. Shaikh  Endocrinology: Dr. Perlman

## 2024-03-15 NOTE — H&P ADULT - PROBLEM SELECTOR PLAN 1
- EKG ventricular paced and no ischemic changes   - Trops 292 --> 272 --> continue to trend   - S/P ASA 324mg PO OD   - Continue ASA 81mg po   - TTE ordered, f/u   - Keep on tele   - Cardiology (Dr. Beck) consulted, f/u recs

## 2024-03-15 NOTE — PROGRESS NOTE ADULT - PROBLEM SELECTOR PLAN 2
- EKG ventricular paced and no ischemic changes    - Continue home Eliquis 5mg BID   - Continue home Amiodarone, Digoxin - EKG ventricular paced and no ischemic changes    - Continue home Eliquis 5mg BID   - Continue home Amiodarone, Digoxin  - digoxin level 0.4 - hx of paroxysmal afib; EKG ventricular paced and no acute ischemic changes    - Continue home Eliquis 5mg BID   - Continue home Amiodarone 100mg po daily, Digoxin MWF dosing  - c/w coreg 25mg po BID  - digoxin level 0.4  - of note, TSH is low at 0.02; free T3 of 3.6 ---- f/up free T4 and total T3  - pt states that his cardiology team had told him the amio may be affecting his thyroid function in the past but decided to continue --- f/up with cardio and endo whether change needs to be made  - Cardiology (Dr. Beck / Dr. Blevins group), recs appreciated  - will have PPM interrogation to see what type of arrhythmia pt was having when he had the palpitations at home

## 2024-03-15 NOTE — PATIENT PROFILE ADULT - FALL HARM RISK - FACTORS NURSING JUDGEMENT
How to Quarantine at Home  Information for Patients and Families    These instructions are for people with confirmed or suspected COVID-19 who do not need to be hospitalized and those with confirmed COVID-19 who were hospitalized and discharged to care for themselves at home.    If you were tested through the Health Department  The Health Department will monitor your wellbeing.  If it is determined that you do not need to be hospitalized and can be isolated at home, you will be monitored by staff from your local or state health department.     If you were tested through a Commercial Lab  You will need to monitor yourself and report changes in your symptoms to your doctor.  See the section below called Monitor Your Symptoms.    Follow these steps until a healthcare provider or local or state health department says you can return to your normal activities.    Stay home except to get medical care  Restrict activities outside your home, except for getting medical care.   Do not go to work, school, or public areas.   Avoid using public transportation, ride-sharing, or taxis.    Separate yourself from other people and animals in your home  People  As much as possible, you should stay in a specific room and away from other people in your home. Also, you should use a separate bathroom, if available.    Animals  You should restrict contact with pets and other animals while you are sick with COVID-19, just like you would around other people. When possible, have another member of your household care for your animals while you are sick. If you are sick with COVID-19, avoid contact with your pet, including petting, snuggling, being kissed or licked, and sharing food. If you must care for your pet or be around animals while you are sick, wash your hands before and after you interact with pets and wear a facemask. See COVID-19 and Animals for more information.    Call ahead before visiting your doctor  If you have a medical  appointment, call the healthcare provider and tell them that you have or may have COVID-19. This information will help the healthcare provider’s office take steps to keep other people from getting infected or exposed.    Wear a facemask  You should wear a facemask when you are around other people (e.g., sharing a room or vehicle) or pets and before you enter a healthcare provider’s office.     If you are not able to wear a facemask (for example, because it causes trouble breathing), then people who live with you should not stay in the same room with you, or they should wear a facemask if they enter your room.    Cover your coughs and sneezes  Cover your mouth and nose with a tissue when you cough or sneeze.   Throw used tissues in a lined trash can.   Immediately wash your hands with soap and water for at least 20 seconds or, if soap and water are not available, clean your hands with an alcohol-based hand  that contains at least 60% alcohol.    Clean your hands often  Wash your hands often with soap and water for at least 20 seconds, especially after blowing your nose, coughing, or sneezing; going to the bathroom; and before eating or preparing food.     If soap and water are not readily available, use an alcohol-based hand  with at least 60% alcohol, covering all surfaces of your hands and rubbing them together until they feel dry.    Soap and water are the best option if hands are visibly dirty. Avoid touching your eyes, nose, and mouth with unwashed hands.    Avoid sharing personal household items  You should not share dishes, drinking glasses, cups, eating utensils, towels, or bedding with other people or pets in your home.   After using these items, they should be washed thoroughly with soap and water.    Clean all “high-touch” surfaces everyday  High touch surfaces include counters, tabletops, doorknobs, bathroom fixtures, toilets, phones, keyboards, tablets, and bedside tables.   Also, clean  any surfaces that may have blood, stool, or body fluids on them.   Use a household cleaning spray or wipe, according to the label instructions. Labels contain instructions for safe and effective use of the cleaning product, including precautions you should take when applying the product, such as wearing gloves and making sure you have good ventilation during use of the product.    Monitor your symptoms  Seek prompt medical attention if your illness is worsening (e.g., difficulty breathing).   Before seeking care, call your healthcare provider and tell them that you have, or are being evaluated for, COVID-19.   Put on a facemask before you enter the facility.     These steps will help the healthcare provider’s office to keep other people in the office or waiting room from getting infected or exposed.   Persons who are placed under active monitoring or facilitated self-monitoring should follow instructions provided by their local health department or occupational health professionals, as appropriate.  If you have a medical emergency and need to call 911, notify the dispatch personnel that you have, or are being evaluated for COVID-19. If possible, put on a facemask before emergency medical services arrive.    Discontinuing home isolation  Patients with confirmed COVID-19 should remain under home isolation precautions until the risk of secondary transmission to others is thought to be low. The decision to discontinue home isolation precautions should be made on a case-by-case basis, in consultation with healthcare providers and state and local health departments.    The below content are for household members, intimate partners, and caregivers of a patient with symptomatic laboratory-confirmed COVID-19 or a patient under investigation:    Household members, intimate partners, and caregivers may have close contact with a person with symptomatic, laboratory-confirmed COVID-19 or a person under investigation.     Close  contacts should monitor their health; they should call their healthcare provider right away if they develop symptoms suggestive of COVID-19 (e.g., fever, cough, shortness of breath)     Close contacts should also follow these recommendations:  Make sure that you understand and can help the patient follow their healthcare provider’s instructions for medication(s) and care. You should help the patient with basic needs in the home and provide support for getting groceries, prescriptions, and other personal needs.  Monitor the patient’s symptoms. If the patient is getting sicker, call his or her healthcare provider and tell them that the patient has laboratory-confirmed COVID-19. This will help the healthcare provider’s office take steps to keep other people in the office or waiting room from getting infected. Ask the healthcare provider to call the local or [facility] health department for additional guidance. If the patient has a medical emergency and you need to call 911, notify the dispatch personnel that the patient has, or is being evaluated for COVID-19.  Household members should stay in another room or be  from the patient as much as possible. Household members should use a separate bedroom and bathroom, if available.  Prohibit visitors who do not have an essential need to be in the home.  Household members should care for any pets in the home. Do not handle pets or other animals while sick.  For more information, see COVID-19 and Animals.  Make sure that shared spaces in the home have good air flow, such as by an air conditioner or an opened window, weather permitting.  Perform hand hygiene frequently. Wash your hands often with soap and water for at least 20 seconds or use an alcohol-based hand  that contains 60 to 95% alcohol, covering all surfaces of your hands and rubbing them together until they feel dry. Soap and water should be used preferentially if hands are visibly dirty.  Avoid touching  your eyes, nose, and mouth with unwashed hands.  The patient should wear a facemask when you are around other people. If the patient is not able to wear a facemask (for example, because it causes trouble breathing), you, as the caregiver, should wear a mask when you are in the same room as the patient.  Wear a disposable facemask and gloves when you touch or have contact with the patient’s blood, stool, or body fluids, such as saliva, sputum, nasal mucus, vomit, or urine.   Throw out disposable facemasks and gloves after using them. Do not reuse.  When removing personal protective equipment, first remove and dispose of gloves. Then, immediately clean your hands with soap and water or alcohol-based hand . Next, remove and dispose of facemask, and immediately clean your hands again with soap and water or alcohol-based hand .  Avoid sharing household items with the patient. You should not share dishes, drinking glasses, cups, eating utensils, towels, bedding, or other items. After the patient uses these items, you should wash them thoroughly (see below “Wash laundry thoroughly”).  Clean all “high-touch” surfaces, such as counters, tabletops, doorknobs, bathroom fixtures, toilets, phones, keyboards, tablets, and bedside tables, every day. Also, clean any surfaces that may have blood, stool, or body fluids on them.   Use a household cleaning spray or wipe, according to the label instructions. Labels contain instructions for safe and effective use of the cleaning product including precautions you should take when applying the product, such as wearing gloves and making sure you have good ventilation during use of the product.  Wash laundry thoroughly.   Immediately remove and wash clothes or bedding that have blood, stool, or body fluids on them.  Wear disposable gloves while handling soiled items and keep soiled items away from your body. Clean your hands (with soap and water or an alcohol-based hand  ) immediately after removing your gloves.  Read and follow directions on labels of laundry or clothing items and detergent. In general, using a normal laundry detergent according to washing machine instructions and dry thoroughly using the warmest temperatures recommended on the clothing label.  Place all used disposable gloves, facemasks, and other contaminated items in a lined container before disposing of them with other household waste. Clean your hands (with soap and water or an alcohol-based hand ) immediately after handling these items. Soap and water should be used preferentially if hands are visibly dirty.  Discuss any additional questions with your state or local health department or healthcare provider.    • Adapted from information provided by the Centers for Disease Control and Prevention.  For more information, visit https://www.cdc.gov/coronavirus/2019-ncov/hcp/guidance-prevent-spread.html(+) positive COVID-19 test result with or without symptoms   • The majority of patients with a positive test result will recover, symptom severity is variable among those infected.   • Certain comorbidities such as COPD/asthma, DM, CAD and others can increase the risk of more severe illness.   • The optimal duration of home isolation is uncertain. The [place] Centers for Disease Control and Prevention (CDC) has issued recommendations on discontinuation of home isolation.  • For this reason, Pranav is strongly encouraged to practice the safest standards in protecting their health and others given the current pandemic concerns.   • If Pranav is asymptomatic, he should self-isolate at home for a total of 14 days from time of when lab test for Covid-19 was collected.   o If he is without symptoms, then he should practice social distancing by staying at least 6 feet from other people, including limiting contact with family (especially at home) and friends as much as possible for at least 14 days.    o Encouraged him to wear a mask in addition to social distancing in the home.   o Implement 14 day home self-quarantining, with the exception of seeking required or essential medical care.   o Continue to wash his hands frequently with soap and hot water, and cover their mouth while coughing.   • If a Pranav is symptomatic then he may discontinue home isolation when the following criteria are met:   o At least seven days have passed since symptoms first appeared AND   o At least three days (72 hours) have passed since recovery of symptoms (defined as resolution of fever without the use of fever-reducing medications and improvement in respiratory symptoms [eg, cough, shortness of breath])   • For support of non-emergent symptoms expected with this virus such as increased shortness of breath, fever, cough, or other questions, Pranav was asked to please call their primary care physician’s office or the [place] hotline at [phone].   • He was advised to seek care in the Emergency Department should extreme symptoms arise such as new onset confusion, extreme lethargy, hypoxia, or new hypotension.   · Questions were engaged and answered to the best of my ability, patient expressed verbal understanding of their test results and my advice.   Yes

## 2024-03-15 NOTE — DISCHARGE NOTE PROVIDER - PROVIDER TOKENS
PROVIDER:[TOKEN:[[id number]],FOLLOWUP:[1 week],ESTABLISHEDPATIENT:[T]],PROVIDER:[TOKEN:[[id number]],FOLLOWUP:[1 week]]

## 2024-03-15 NOTE — DISCHARGE NOTE PROVIDER - NSDCMRMEDTOKEN_GEN_ALL_CORE_FT
amiodarone 100 mg oral tablet: 1 tab(s) orally once a day  amLODIPine 10 mg oral tablet: 1 tab(s) orally once a day  aspirin 81 mg oral capsule: 1 cap(s) orally once a day  carvedilol 25 mg oral tablet: 1 tab(s) orally 2 times a day  digoxin 125 mcg (0.125 mg) oral tablet: 1 tab(s) orally Monday, Wednesday, and Friday  Eliquis 5 mg oral tablet: 1 tab(s) orally 2 times a day  finasteride 5 mg oral tablet: 1 tab(s) orally once a day  Keytruda 25 mg/mL intravenous solution: 200 milligram(s) intravenously  Lasix 20 mg oral tablet: 1 tab(s) orally once a day  levothyroxine 100 mcg (0.1 mg) oral capsule: 1 cap(s) orally once a day  NexIUM 20 mg oral delayed release capsule: 1 cap(s) orally 2 times a day  potassium bicarbonate 10 mEq oral tablet, effervescent: 1 tab(s) orally once a day  Rocaltrol 0.25 mcg oral capsule: 1 cap(s) orally once a day  rosuvastatin 10 mg oral tablet: 1 tab(s) orally once a day (at bedtime)   amiodarone 100 mg oral tablet: 1 tab(s) orally once a day  amLODIPine 10 mg oral tablet: 1 tab(s) orally once a day  aspirin 81 mg oral capsule: 1 cap(s) orally once a day  carvedilol 25 mg oral tablet: 1 tab(s) orally 2 times a day  digoxin 125 mcg (0.125 mg) oral tablet: 1 tab(s) orally Monday, Wednesday, and Friday  Eliquis 5 mg oral tablet: 1 tab(s) orally 2 times a day  finasteride 5 mg oral tablet: 1 tab(s) orally once a day  Keytruda 25 mg/mL intravenous solution: 200 milligram(s) intravenously  Lasix 20 mg oral tablet: 1 tab(s) orally once a day  NexIUM 20 mg oral delayed release capsule: 1 cap(s) orally 2 times a day  potassium bicarbonate 10 mEq oral tablet, effervescent: 1 tab(s) orally once a day  Rocaltrol 0.25 mcg oral capsule: 1 cap(s) orally once a day  rosuvastatin 10 mg oral tablet: 1 tab(s) orally once a day (at bedtime)

## 2024-03-15 NOTE — PROGRESS NOTE ADULT - TIME BILLING
Pt seen + examined on 3/15. Case discussed with resident. Agree with assessment and plan above (edited by me in detail):  Time spent: 51min. Time spent discussing/coordinating care with consultants, CM/SW team, and counseling the patient and pt's wife on medical condition -- palpitations, to have PPM interrogation, SHARRON on CKD3, elevated troponin, demand ischemia, hypertensive urgency, low TSH, how amio may contribute to thyroid dysfunction.    82yo M w/ PMH of NSTEMI, s/p RCA stent, HFrEF, CKD3, labile hypertension, paroxysmal afib (on Eliquis), PPM and h/o AAA repair w/ repair in Sept 2019, bladder cancer on Keytruda, Hypothyroidism, HLD presents to the ED w/ palpitations that started at 9pm last night a/w palpitations, SHARRON on CKD3, elevated troponin, hypertensive urgency.    - Trops mildly elevated and trended to peak, suspected demand ischemia due to palpitations (possibly afib w/ RVR at home)  - S/P ASA 324mg PO x1 in ED  - EKG ventricular paced and no acute ischemic changes   - Continue ASA 81mg po daily  - c/w lipitor 40mg po QHS  - TTE ordered, f/up   - Cardiology (Dr. Beck / Dr. Blevins group), recs appreciated    - hx of paroxysmal afib; EKG ventricular paced and no acute ischemic changes    - Continue home Eliquis 5mg BID   - Continue home Amiodarone 100mg po daily, Digoxin MWF dosing  - c/w coreg 25mg po BID  - digoxin level 0.4  - of note, TSH is low at 0.02; free T3 of 3.6 ---- f/up free T4 and total T3  - pt states that his cardiology team had told him the amio may be affecting his thyroid function in the past but decided to continue --- f/up with cardio and endo whether change needs to be made  - Cardiology (Dr. Beck / Dr. Blevins group), recs appreciated  - will have PPM interrogation to see what type of arrhythmia pt was having when he had the palpitations at home    - Baseline creatinine possibly ~1.4 -- pt states his Cr had recently fluctuated and was 2.1 couple of week ago but on repeat blood test was back down to 1.4 approx 10 days ago  - Creatinine Currently 2.2  - SHARRON on CKD3 -- suspect due to dehydration (reported to nephro he had taken extra lasix and also did not eat/drink as well day of admission)  - Monitor BMP  - FeNa/FeUrea, UA  - Avoid nephrotoxic medications as able  - nephrology (Dr. Shaikh group) consulted, recs appreciated  - will give gentle hydration with 1/2 NS at 75cc/hr for 6 hours and recheck BMP in AM    - Continue home levothyroxine -- consider decreasing dose pending further TFTs and endo recs  - TSH is low at 0.02; free T3 of 3.6 ---- f/up free T4 and total T3  - pt states that his cardiology team had told him the amio may be affecting his thyroid function in the past but decided to continue --- f/up with cardio and endo whether change needs to be made  - endo (Dr. Perlman), f/up recs

## 2024-03-15 NOTE — H&P ADULT - NSHPSOCIALHISTORY_GEN_ALL_CORE
Tobacco Usage:  denies  Alcohol Usage: denies  Substance Use:  denies  Lives with: wife   ADLs: independent

## 2024-03-15 NOTE — PROGRESS NOTE ADULT - PROBLEM SELECTOR PLAN 5
- Continue home ASA and statin - Continue home amlodipine - had hypertensive urgency on admission -- BP now improved  - has documentation that pt had suspected white coat HTN, as well, on outpt records; labile BP  - Continue home amlodipine and coreg  - monitor VS

## 2024-03-15 NOTE — DISCHARGE NOTE PROVIDER - NSDCCPCAREPLAN_GEN_ALL_CORE_FT
PRINCIPAL DISCHARGE DIAGNOSIS  Diagnosis: Palpitations  Assessment and Plan of Treatment: You presented with palpitations and elevated HR, however on our EKG your heart rate was normal and adeqautely paced by your pacemaker. Your pacemaker did not detect any dangerous heart rhythms on interrogation by the [employer] representative. Additionally, your palpitations could have been due to elevated thyroid hormone. You saw our endocrinologist who recommended stopping Levothyroxine and following up outpatient for repeat testing. Cardiology recommended discontinuing Amiodarone as this may be causing thyroid disfunction, however you elected to continue the medication until you consulted your Cardiologist, Dr. Beck.  We are still awaiting the final results of your Echocardiogram, however the physician who will read the results is part of Dr. Beck's group and should be following up.   STOP Levothyroxine   Follow-up with Endocrinology, Dr. Perlman in 1-2 weeks.   Follow-up with your Cardiologist and Electrophysilogist upon discharge.      SECONDARY DISCHARGE DIAGNOSES  Diagnosis: Elevated troponin  Assessment and Plan of Treatment: Your troponin enzyme was elevated on admission. This enzyme can increase in the blood when the heart becomes stressed. Your hormone level decreased shortly afterwards. It was likely elevated due to increased stress on the heart from your Atrial fibrillation, fast heart rate, and elevated blood pressure. Please follow-up with Cardiology.    Diagnosis: Hypertension  Assessment and Plan of Treatment: Your blood pressure was over 190/70, which is markedly elevated. You informed us about White-Coat Hypertension. Your blood pressure decreased throughout your admission. Please continue to monitor at home.    Diagnosis: Hypothyroidism  Assessment and Plan of Treatment: You have low thyroid hormone at baseline for which you take Levothyroxine. However, on admission your hormone levels indicated that your thyroid hormone is currently being over-produced. Our Endocrinologist recommended you stop taking Levothyroxine entirely and follow-up outpatient. This may also be due to your Amiodarone as this has a side effect of thyroid dysfunction. Please follow-up with Cardiology.   STOP Levothyroxine     PRINCIPAL DISCHARGE DIAGNOSIS  Diagnosis: Palpitations  Assessment and Plan of Treatment: You presented with palpitations and elevated heart rate, however on our EKG your heart rate was normal and adeqautely paced by your pacemaker. Your pacemaker did not detect any dangerous heart rhythms on interrogation by the [employer] representative. Additionally, your palpitations could have been due to elevated thyroid hormone. You saw our endocrinologist who recommended stopping Levothyroxine and following up outpatient for repeat testing, thyroid ultrasound and further management.   Cardiology recommended discontinuing Amiodarone as this may be causing thyroid disfunction, however you elected to continue the medication until you consulted your Cardiologist, Dr. Beck.   We are still awaiting the final results of your Echocardiogram, however the physician who will read the results is part of Dr. Beck's group with whom you will follow up.   *STOP Levothyroxine.   Follow-up with your Cardiologist and Electrophysiologist upon discharge.   Follow-up with Endocrinologist, Dr. Perlman in 1 week.      SECONDARY DISCHARGE DIAGNOSES  Diagnosis: Elevated troponin  Assessment and Plan of Treatment: Your troponin enzyme was elevated on admission. This enzyme can increase in the blood when the heart becomes stressed. It was likely elevated due to increased stress on the heart from your Atrial fibrillation with fast heart rate and elevated blood pressure. Please follow-up with Cardiology.    Diagnosis: Hypertension  Assessment and Plan of Treatment: Your blood pressure was over 190/70, which is markedly elevated. You informed us about White-Coat Hypertension. Your blood pressure decreased throughout your admission. Please continue to monitor at home.    Diagnosis: Hypothyroidism  Assessment and Plan of Treatment: You have low thyroid hormone at baseline for which you take Levothyroxine. However, on admission your hormone levels indicated that your thyroid hormone is currently being over-produced. Our Endocrinologist recommended you stop taking Levothyroxine entirely and follow-up outpatient. This may also be due to your Amiodarone as this has a side effect of thyroid dysfunction.   Please STOP Levothyroxine for now, and follow-up with Cardiology and Endocrinology.

## 2024-03-15 NOTE — PROGRESS NOTE ADULT - PROBLEM SELECTOR PLAN 7
- On Keytruda - Continue home levothyroxine  - TSH 0.02   - Free T3 wnl  Endo consulted, f/u recs - Continue home levothyroxine -- consider decreasing dose pending further TFTs and endo recs  - TSH is low at 0.02; free T3 of 3.6 ---- f/up free T4 and total T3  - pt states that his cardiology team had told him the amio may be affecting his thyroid function in the past but decided to continue --- f/up with cardio and endo whether change needs to be made  - endo (Dr. Perlman), f/up recs

## 2024-03-15 NOTE — CARE COORDINATION ASSESSMENT. - NSCAREPROVIDERS_GEN_ALL_CORE_FT
CARE PROVIDERS:  Accepting Physician: Krupa Giraldo  Administration: Foster Stanley  Administration: Conrad Crenshaw  Administration: Gabby Harrison  Administration: Davy Camargo  Admitting: Krupa Giraldo  Attending: Krupa Giraldo  Cardiology Technician: Nelly Britton  Case Management: Morro Vitale  Case Management: Madelaine Drake  Consultant: Tracy Beck  Consultant: Bart Blevins  ED Attending: Conrad Johnson  ED Nurse: Erick Sauceda  Nurse: Jailyn Rogers  Nurse: Silvia Sanchez  Nurse: Sheri Ford  Ordered: ADM, User  Override: Silvia Sanchez  Override: Sheri Ford  PCA/Nursing Assistant: Maulik Villareal  Primary Team: Viral Tiwari  Primary Team: Christen Owen  Primary Team: Forrest Jenkins  Primary Team: Krupa Giraldo  : Oumou Addison  Team: [facility], Team  UR// Supp. Assoc.: Carolin Castrejon  UR// Supp. Assoc.: Benito Strong

## 2024-03-15 NOTE — H&P ADULT - PROBLEM SELECTOR PLAN 3
LV systolic dysfunction, patient appears to be euvolemic   - TTE ordered, f/u   - Hold home lasix   - Continue home Carvedilol

## 2024-03-15 NOTE — PROGRESS NOTE ADULT - PROBLEM SELECTOR PLAN 4
- Continue home amlodipine - Baseline creatinine 1.8  - Creatinine Currently 2.2  - Monitor BMP  - FeNa/FeUrea, UA  - Avoid nephrotoxic medications as able  - nephrology consulted - Baseline creatinine possibly ~1.4 -- pt states his Cr had recently fluctuated and was 2.1 couple of week ago but on repeat blood test was back down to 1.4 approx 10 days ago  - Creatinine Currently 2.2  - SHARRON on CKD3 -- suspect due to dehydration (reported to nephro he had taken extra lasix and also did not eat/drink as well day of admission)  - Monitor BMP  - FeNa/FeUrea, UA  - Avoid nephrotoxic medications as able  - nephrology (Dr. Shaikh group) consulted, recs appreciated  - will give gentle hydration with 1/2 NS at 75cc/hr for 6 hours and recheck BMP in AM

## 2024-03-15 NOTE — DISCHARGE NOTE PROVIDER - CARE PROVIDER_API CALL
Tracy Beck  Cardiovascular Disease  [address] [place] [address]  Phone: [phone]  Fax: [phone]  Established Patient  Follow Up Time: 1 week    Perlman, Craig Douglas  Internal Medicine  [address] [place] [address] [address]  [place] [address]  Phone: [phone]  Fax: [phone]  Follow Up Time: 1 week

## 2024-03-15 NOTE — H&P ADULT - NSHPPHYSICALEXAM_GEN_ALL_CORE
VITALS:   T(C): 36.6 (03-14-24 @ 23:08), Max: 36.6 (03-14-24 @ 23:08)  HR: 88 (03-15-24 @ 01:40) (88 - 108)  BP: 160/64 (03-15-24 @ 01:40) (160/64 - 191/79)  RR: 18 (03-14-24 @ 23:08) (18 - 18)  SpO2: 98% (03-14-24 @ 23:08) (98% - 98%)    GENERAL: NAD, lying in bed comfortably  HEAD:  Atraumatic, Normocephalic  EYES: EOMI, PERRLA, conjunctiva and sclera clear  ENT: Moist mucous membranes  NECK: Supple, No JVD  CHEST/LUNG: Clear to auscultation bilaterally; No rales, rhonchi, wheezing, or rubs. Unlabored respirations  HEART: irregular rate and rhythm; No murmurs, rubs, or gallops  ABDOMEN: BSx4; Soft, nontender, nondistended  EXTREMITIES:  2+ Peripheral Pulses, brisk capillary refill. No clubbing, cyanosis, or edema  NERVOUS SYSTEM:  A&Ox3, no focal deficits   SKIN: No rashes or lesions

## 2024-03-15 NOTE — H&P ADULT - NSHPREVIEWOFSYSTEMS_GEN_ALL_CORE
CONSTITUTIONAL: denies fever, chills, fatigue, weakness  HEENT: denies blurred vision, sore throat  SKIN: denies new lesions, rash  CARDIOVASCULAR: denies chest pain, chest pressure, palpitations  RESPIRATORY: denies shortness of breath, sputum production  GASTROINTESTINAL: denies nausea, vomiting, diarrhea, abdominal pain  GENITOURINARY: denies dysuria, discharge  NEUROLOGICAL: denies numbness, headache, focal weakness  MUSCULOSKELETAL: denies new joint pain, muscle aches  HEMATOLOGIC: denies gross bleeding, bruising  LYMPHATICS: denies enlarged lymph nodes, extremity swelling  PSYCHIATRIC: denies recent changes in anxiety, depression  ENDOCRINOLOGIC: denies sweating, cold or heat intolerance CONSTITUTIONAL: denies fever, chills, fatigue, weakness  HEENT: denies blurred vision, sore throat  SKIN: denies new lesions, rash  CARDIOVASCULAR: denies chest pain, chest pressure, + palpitations  RESPIRATORY: denies shortness of breath, sputum production  GASTROINTESTINAL: + nausea, denies vomiting, diarrhea, abdominal pain  GENITOURINARY: denies dysuria, discharge  NEUROLOGICAL: denies numbness, headache, focal weakness  MUSCULOSKELETAL: denies new joint pain, muscle aches  HEMATOLOGIC: denies gross bleeding, bruising  LYMPHATICS: denies enlarged lymph nodes, extremity swelling  PSYCHIATRIC: denies recent changes in anxiety, depression  ENDOCRINOLOGIC: denies sweating, cold or heat intolerance

## 2024-03-15 NOTE — DISCHARGE NOTE PROVIDER - NSTOBACCOUSAGEY/N_GEN_A_CS
83 yo F PMHx pacemaker, AFIB on Coumadin, hypothyroidism, being treated for UTI with Clindamycin, p/w diarrhea, fall and hip pain, no deformity of hip/leg, will obtain CTH, Xray pelvis/hip, labs to evaluate for electrolyte disturbances, IVF, reevaluate
No

## 2024-03-15 NOTE — H&P ADULT - ATTENDING COMMENTS
84 yo M PMHx paroxysmal afib on eliquis, CKD, NSTEMI s/p stent), AAA repair, renal cancer on Keytruda, HTN, hx of SSS s/p PPM, HLD, DM who presents for palpations. Pt noted that he has experiencing generalized malaise and felt his pulse and noted it to be elevated. Endorsed nausea but states he normally gets nauseous since using Keytruda. Denied diaphoresis, denied chest pressure or pain. Endorses regularly taking meds. In the ED trop elevated to 290, received aspirin, Dr Beck, pt's cardiologist was consulted and they will see patient. Continue to trend trop and repeat EKG. If trop continues to increase, can start heparin; otherwise will continue with Eliquis. Telemetry monitoring. Also noted to have low TSH. Will need to check free T4 and T3. possibly make adjustments to thyroid medication if elevated.

## 2024-03-15 NOTE — H&P ADULT - HISTORY OF PRESENT ILLNESS
82 yo M w/ PMHX of NSTEMI, RCA stent, LV systolic dysfunction, labile hypertension, PAF, PPM and h/o AAA repair w/ repair in sept 2019, Renal cancer on Keytruda, Hypothyroidism, HLD presents to the ED w/ ----     In the ED,   Vitals: T: 97.9, HR: 108, BP: 191/79, RR: 18, O2: 98% on RA   EKG:   Significant labs: Hgb: 9.5, INR: 1.19, Trops 292-->272, Cr/BUN: 67/2.20, CPK: 4.1, Digoxin: 0.4   Imaging: None   Given: ASA 324mg PO x1, Coreg 25mg PO OD,   84 yo M w/ PMHX of NSTEMI, RCA stent, LV systolic dysfunction, labile hypertension, PAF, PPM and h/o AAA repair w/ repair in sept 2019, bladdercancer on Keytruda, Hypothyroidism, HLD presents to the ED w/ palpitations that started at 9pm last night. He also admitted to feeling nauseous at the same time he was having palpitations. He presented to the hospital and his HR was noted to be in the 100s. EKG showed ventricular paced with HR of 70 and trops were elevated but ekg showed no ischemic changes and denies chest pain.     In the ED,   Vitals: T: 97.9, HR: 108, BP: 191/79, RR: 18, O2: 98% on RA   EKG: Ventricular Paced HR 70 bpm   Significant labs: Hgb: 9.5, INR: 1.19, Trops 292-->272, Cr/BUN: 67/2.20, CPK: 4.1, Digoxin: 0.4   Imaging: None   Given: ASA 324mg PO x1, Coreg 25mg PO OD

## 2024-03-15 NOTE — PROGRESS NOTE ADULT - PROBLEM SELECTOR PLAN 3
LV systolic dysfunction, patient appears to be euvolemic   - TTE ordered, f/u   - Hold home lasix   - Continue home Carvedilol - chronic systolic CHF  - pt somewhat hypovolemic at this time  - TTE ordered, f/u   - Hold home lasix for now   - Continue home Carvedilol

## 2024-03-15 NOTE — PROGRESS NOTE ADULT - SUBJECTIVE AND OBJECTIVE BOX
Patient is a 83y old  Male who presents with a chief complaint of Palpitations (15 Mar 2024 01:34)      INTERVAL HPI/OVERNIGHT EVENTS: Patient seen and examined at bedside. No overnight events occurred. Patient endorsed nausea last night but no emesis. Denies fevers, chills, headache, lightheadedness, chest pain, dyspnea, abdominal pain, v/d/c.    MEDICATIONS  (STANDING):  aMIOdarone    Tablet 100 milliGRAM(s) Oral daily  amLODIPine   Tablet 10 milliGRAM(s) Oral daily  apixaban 5 milliGRAM(s) Oral two times a day  aspirin  chewable 81 milliGRAM(s) Oral daily  atorvastatin 40 milliGRAM(s) Oral at bedtime  calcitriol   Capsule 0.25 MICROGram(s) Oral daily  carvedilol 25 milliGRAM(s) Oral every 12 hours  digoxin     Tablet 125 MICROGram(s) Oral <User Schedule>  finasteride 5 milliGRAM(s) Oral daily  levothyroxine 100 MICROGram(s) Oral daily    MEDICATIONS  (PRN):      Allergies    iodine (Rash)    Intolerances        REVIEW OF SYSTEMS:  CONSTITUTIONAL: No fever or chills  HEENT:  No headache, no sore throat  RESPIRATORY: No cough, wheezing, or shortness of breath  CARDIOVASCULAR: No chest pain, palpitations  GASTROINTESTINAL: No abd pain, +nausea, no vomiting, or diarrhea  GENITOURINARY: No dysuria, frequency, or hematuria  NEUROLOGICAL: no focal weakness or dizziness  MUSCULOSKELETAL: no myalgias     Vital Signs Last 24 Hrs  T(C): 37 (15 Mar 2024 07:37), Max: 37 (15 Mar 2024 07:37)  T(F): 98.6 (15 Mar 2024 07:37), Max: 98.6 (15 Mar 2024 07:37)  HR: 74 (15 Mar 2024 07:37) (73 - 108)  BP: 148/69 (15 Mar 2024 07:37) (148/69 - 191/79)  BP(mean): --  RR: 16 (15 Mar 2024 07:37) (16 - 18)  SpO2: 97% (15 Mar 2024 07:37) (95% - 98%)    Parameters below as of 15 Mar 2024 07:37  Patient On (Oxygen Delivery Method): room air        PHYSICAL EXAM:  GENERAL: NAD  HEENT:  anicteric, moist mucous membranes  CHEST/LUNG:  CTA b/l, no rales, wheezes, or rhonchi  HEART:  RRR, S1, S2  ABDOMEN:  BS+, soft, nontender, nondistended  EXTREMITIES: no edema, cyanosis, or calf tenderness  NERVOUS SYSTEM: answers questions and follows commands appropriately    LABS:                        8.7    5.45  )-----------( 147      ( 15 Mar 2024 04:26 )             26.8     CBC Full  -  ( 15 Mar 2024 04:26 )  WBC Count : 5.45 K/uL  Hemoglobin : 8.7 g/dL  Hematocrit : 26.8 %  Platelet Count - Automated : 147 K/uL  Mean Cell Volume : 87.0 fl  Mean Cell Hemoglobin : 28.2 pg  Mean Cell Hemoglobin Concentration : 32.5 gm/dL  Auto Neutrophil # : x  Auto Lymphocyte # : x  Auto Monocyte # : x  Auto Eosinophil # : x  Auto Basophil # : x  Auto Neutrophil % : x  Auto Lymphocyte % : x  Auto Monocyte % : x  Auto Eosinophil % : x  Auto Basophil % : x    15 Mar 2024 04:26    146    |  112    |  63     ----------------------------<  99     4.4     |  25     |  2.20     Ca    9.0        15 Mar 2024 04:26  Phos  3.6       14 Mar 2024 23:47  Mg     2.0       14 Mar 2024 23:47    TPro  6.5    /  Alb  3.0    /  TBili  0.3    /  DBili  x      /  AST  13     /  ALT  15     /  AlkPhos  56     15 Mar 2024 04:26    PT/INR - ( 14 Mar 2024 23:47 )   PT: 13.8 sec;   INR: 1.19 ratio         PTT - ( 14 Mar 2024 23:47 )  PTT:34.7 sec  Urinalysis Basic - ( 15 Mar 2024 04:26 )    Color: x / Appearance: x / SG: x / pH: x  Gluc: 99 mg/dL / Ketone: x  / Bili: x / Urobili: x   Blood: x / Protein: x / Nitrite: x   Leuk Esterase: x / RBC: x / WBC x   Sq Epi: x / Non Sq Epi: x / Bacteria: x      CAPILLARY BLOOD GLUCOSE              RADIOLOGY & ADDITIONAL TESTS:    Personally reviewed.     Consultant(s) Notes Reviewed:  [x] YES  [ ] NO     Patient is a 83y old  Male who presents with a chief complaint of palpitations.       INTERVAL HPI/OVERNIGHT EVENTS: Patient seen and examined at bedside. No acute overnight events occurred. Patient endorsed nausea last night, but no emesis. Denies fevers, chills, headache, lightheadedness, chest pain, dyspnea, abdominal pain, v/d/c. No further nausea today and tolerating meals well.     MEDICATIONS  (STANDING):  aMIOdarone    Tablet 100 milliGRAM(s) Oral daily  amLODIPine   Tablet 10 milliGRAM(s) Oral daily  apixaban 5 milliGRAM(s) Oral two times a day  aspirin  chewable 81 milliGRAM(s) Oral daily  atorvastatin 40 milliGRAM(s) Oral at bedtime  calcitriol   Capsule 0.25 MICROGram(s) Oral daily  carvedilol 25 milliGRAM(s) Oral every 12 hours  digoxin     Tablet 125 MICROGram(s) Oral <User Schedule>  finasteride 5 milliGRAM(s) Oral daily  levothyroxine 100 MICROGram(s) Oral daily    MEDICATIONS  (PRN):      Allergies    iodine (Rash)    Intolerances        REVIEW OF SYSTEMS:  CONSTITUTIONAL: No fever or chills  HEENT:  No headache, no sore throat  RESPIRATORY: No cough, wheezing, or shortness of breath  CARDIOVASCULAR: No chest pain, palpitations  GASTROINTESTINAL: No abd pain, +nausea, no vomiting, or diarrhea  GENITOURINARY: No dysuria, frequency, or hematuria  NEUROLOGICAL: no focal weakness or dizziness  MUSCULOSKELETAL: no myalgias     Vital Signs Last 24 Hrs  T(C): 37 (15 Mar 2024 07:37), Max: 37 (15 Mar 2024 07:37)  T(F): 98.6 (15 Mar 2024 07:37), Max: 98.6 (15 Mar 2024 07:37)  HR: 74 (15 Mar 2024 07:37) (73 - 108)  BP: 148/69 (15 Mar 2024 07:37) (148/69 - 191/79)  BP(mean): --  RR: 16 (15 Mar 2024 07:37) (16 - 18)  SpO2: 97% (15 Mar 2024 07:37) (95% - 98%)    Parameters below as of 15 Mar 2024 07:37  Patient On (Oxygen Delivery Method): room air        PHYSICAL EXAM:  GENERAL: NAD  HEENT:  anicteric, moist mucous membranes  CHEST/LUNG:  CTA b/l, no rales, wheezes, or rhonchi  HEART:  RRR, S1, S2  ABDOMEN:  BS+, soft, nontender, nondistended  EXTREMITIES: no cyanosis or calf tenderness  NERVOUS SYSTEM: answers questions and follows commands appropriately    LABS:                        8.7    5.45  )-----------( 147      ( 15 Mar 2024 04:26 )             26.8     CBC Full  -  ( 15 Mar 2024 04:26 )  WBC Count : 5.45 K/uL  Hemoglobin : 8.7 g/dL  Hematocrit : 26.8 %  Platelet Count - Automated : 147 K/uL  Mean Cell Volume : 87.0 fl  Mean Cell Hemoglobin : 28.2 pg  Mean Cell Hemoglobin Concentration : 32.5 gm/dL  Auto Neutrophil # : x  Auto Lymphocyte # : x  Auto Monocyte # : x  Auto Eosinophil # : x  Auto Basophil # : x  Auto Neutrophil % : x  Auto Lymphocyte % : x  Auto Monocyte % : x  Auto Eosinophil % : x  Auto Basophil % : x    15 Mar 2024 04:26    146    |  112    |  63     ----------------------------<  99     4.4     |  25     |  2.20     Ca    9.0        15 Mar 2024 04:26  Phos  3.6       14 Mar 2024 23:47  Mg     2.0       14 Mar 2024 23:47    TPro  6.5    /  Alb  3.0    /  TBili  0.3    /  DBili  x      /  AST  13     /  ALT  15     /  AlkPhos  56     15 Mar 2024 04:26    PT/INR - ( 14 Mar 2024 23:47 )   PT: 13.8 sec;   INR: 1.19 ratio         PTT - ( 14 Mar 2024 23:47 )  PTT:34.7 sec  Urinalysis Basic - ( 15 Mar 2024 04:26 )    Color: x / Appearance: x / SG: x / pH: x  Gluc: 99 mg/dL / Ketone: x  / Bili: x / Urobili: x   Blood: x / Protein: x / Nitrite: x   Leuk Esterase: x / RBC: x / WBC x   Sq Epi: x / Non Sq Epi: x / Bacteria: x      CAPILLARY BLOOD GLUCOSE              RADIOLOGY & ADDITIONAL TESTS:    Personally reviewed.     Consultant(s) Notes Reviewed:  [x] YES  [ ] NO

## 2024-03-15 NOTE — DISCHARGE NOTE PROVIDER - ATTENDING DISCHARGE PHYSICAL EXAMINATION:
T(C): 36.8 (03-16-24 @ 11:42), Max: 36.8 (03-16-24 @ 11:42)  HR: 68 (03-16-24 @ 11:42) (68 - 73)  BP: 160/54 (03-16-24 @ 11:42) (157/77 - 177/71)  RR: 18 (03-16-24 @ 11:42) (18 - 18)  SpO2: 95% (03-16-24 @ 11:42) (92% - 95%)  Patient On (Oxygen Delivery Method): room air    GENERAL: patient appears well, no acute distress  EYES: sclera clear, no exudates  ENMT: oropharynx clear without erythema, no exudates, moist mucous membranes  NECK: supple, soft, no thyromegaly noted  LUNGS: good air entry bilaterally, clear to auscultation, symmetric breath sounds, no wheezing or rhonchi appreciated  HEART: +irregular rhythm, S1/S2, regular rate, no murmurs noted, no lower extremity edema  GASTROINTESTINAL: abdomen is soft, nontender, nondistended, normoactive bowel sounds, no palpable masses  INTEGUMENT: good skin turgor, no lesions noted  MUSCULOSKELETAL: no clubbing or cyanosis, no obvious deformity  NEUROLOGIC: awake, alert, oriented x3, good muscle tone in 4 extremities, no obvious sensory deficits

## 2024-03-15 NOTE — ED ADULT NURSE NOTE - NSFALLUNIVINTERV_ED_ALL_ED
Bed/Stretcher in lowest position, wheels locked, appropriate side rails in place/Call bell, personal items and telephone in reach/Instruct patient to call for assistance before getting out of bed/chair/stretcher/Non-slip footwear applied when patient is off stretcher/[place] to call system/Physically safe environment - no spills, clutter or unnecessary equipment/Purposeful proactive rounding/Room/bathroom lighting operational, light cord in reach

## 2024-03-15 NOTE — CARE COORDINATION ASSESSMENT. - NSPASTMEDSURGHISTORY_GEN_ALL_CORE_FT
PAST MEDICAL & SURGICAL HISTORY:  Pacemaker      Hypercholesterolemia      Atrial fibrillation      HTN (hypertension)      Bladder cancer

## 2024-03-15 NOTE — CARE COORDINATION ASSESSMENT. - OTHER PERTINENT DISCHARGE PLANNING INFORMATION:
CM met with patient and spouse (Dash) at bedside to explain role and transitions of care. Patient lives with spouse in a private house with 3 steps to get in and 0 stairs inside.  Patient was fully independent prior to admission.  No caregiver identified, no home care or DMEs.  Family will transport patient home when ready to be discharged.  No needs identified.  CM explained  home care expectation, process, insurance provision and home safety with good understanding. CM to make referral. Patient  verbalized understanding of plans after discharge and is in agreement.  Resource folder left at bedside.  All questions answered to the best of my abilities.  CM remains available throughout the hospital stay.

## 2024-03-15 NOTE — H&P ADULT - PROBLEM SELECTOR PLAN 2
- EKG ventricular paced and no ischemic changes    - Continue home Eliquis 5mg BID   - Continue home Amiodarone, Digoxin

## 2024-03-15 NOTE — ED ADULT NURSE REASSESSMENT NOTE - NS ED NURSE REASSESS COMMENT FT1
Assumed patient care, patient is AOx3, patient disconnected from monitor, ambulated to bathroom with steady gait, returned back to room and placed back on monitor. Patient denies complaints at this time, denies CP or palpitations. Patient awaiting admission bed placement on floor. Will continue to monitor.

## 2024-03-15 NOTE — ED ADULT NURSE NOTE - OBJECTIVE STATEMENT
Assessment/Plan:    LPRD (laryngopharyngeal reflux disease)  STABLE  DENIES ANY CP, INDIGESTION, OR COUGH  NOTES NO MELENA OR HEMATOCHEZIA  NO HEMATEMESIS  COMPLIANT WITH MEDICATION  NO CONCERNS    - CONTINUE CURRENT TREATMENT PLAN  - MEDICATION AS PRESCRIBED  - AVOID CAFFEINE, ALCOHOL OR SMOKING      Osteoarthritis  STABLE  DENIES ANY JOINT SWELLING OR REDNESS  JOINT STIFFNESS PRESENT  PAIN MANAGEMENT ADEQUATE    - CONTINUE CURRENT MANAGEMENT  - MEDICATION AS DIRECTED  - CALL / RETURN IF SYMPTOMS WORSEN      Dysthymia  STABLE      Obstructive sleep apnea syndrome  DISCUSSED WORK UP FOR SLEEP APNEA TO GET HIS MACHINE CHECKED OUT    Fatigue  CONTINUED FATIGUE  REVIEWED ISSUES    DISCUSSED THERAPEUTIC OPTIONS       Diagnoses and all orders for this visit:    Mixed hyperlipidemia  -     Lipid panel    Dysthymia    LPRD (laryngopharyngeal reflux disease)    Primary osteoarthritis involving multiple joints    Obstructive sleep apnea syndrome  -     Ambulatory referral to Pulmonology; Future    Hip pain, bilateral  -     XR hip/pelv 2-3 vws left if performed; Future  -     XR hip/pelv 2-3 vws right if performed;  Future  -     Uric acid  -     C-reactive protein  -     Sedimentation rate, automated    Fatigue, unspecified type  -     TSH, 3rd generation  -     CBC and differential  -     Comprehensive metabolic panel  -     Lipid panel  -     Uric acid  -     C-reactive protein  -     Sedimentation rate, automated  -     Hepatitis B surface antigen  -     Hepatitis B surface antibody  -     Rheumatoid Factor    Hepatitis B surface antigen positive  -     Hepatitis B surface antigen  -     Hepatitis B surface antibody    Other orders  -     Discontinue: tamsulosin (FLOMAX) 0 4 mg; TK 1 C PO QD HS          Patient Instructions   CONTINUE CURRENT TREATMENT PLAN  MONITOR DIETARY SODIUM, CHOL, AND CARBOHYDRATE INTAKE  ENCOURAGE PHYSICAL ACTIVITY      RV 6 M, SOONER PRN        Return in about 3 weeks (around 8/28/2020) for virtual re check  Subjective:      Patient ID: Kaleigh Lobo is a 69 y o  male  Chief Complaint   Patient presents with    Follow-up       PATIENT RETURNS FOR ROUTINE EVALUATION OF PATIENT'S MEDICAL ISSUES    INDIVIDUAL MEDICAL ISSUES WITH THEIR CURRENT STATUS, ASSESSMENT AND PLANS ARE LISTED ABOVE          The following portions of the patient's history were reviewed and updated as appropriate: allergies, current medications, past family history, past medical history, past social history, past surgical history and problem list     Review of Systems   Constitutional: Negative for chills, fatigue and fever  HENT: Negative for congestion, ear discharge, ear pain, mouth sores, postnasal drip, sore throat and trouble swallowing  Eyes: Negative for pain, discharge and visual disturbance  Respiratory: Negative for cough, shortness of breath and wheezing  Cardiovascular: Negative for chest pain, palpitations and leg swelling  Gastrointestinal: Negative for abdominal distention, abdominal pain, blood in stool, diarrhea and nausea  Endocrine: Negative for polydipsia, polyphagia and polyuria  Genitourinary: Negative for dysuria, frequency, hematuria and urgency  Musculoskeletal: Negative for arthralgias, gait problem and joint swelling  Skin: Negative for pallor and rash  Neurological: Negative for dizziness, syncope, speech difficulty, weakness, light-headedness, numbness and headaches  Hematological: Negative for adenopathy  Psychiatric/Behavioral: Negative for behavioral problems, confusion and sleep disturbance  The patient is not nervous/anxious            Current Outpatient Medications   Medication Sig Dispense Refill    alfuzosin (UROXATRAL) 10 mg 24 hr tablet Take 10 mg by mouth daily      aspirin 325 mg tablet Take 325 mg by mouth      celecoxib (CeleBREX) 200 mg capsule TK 1 C PO QD PRN  0    econazole nitrate 1 % cream Apply 1 application topically      HYDROcodone-acetaminophen (XODOL) 5-300 MG per tablet TK 1 T PO Q 6 H PRN  0    hydrOXYzine HCL (ATARAX) 10 mg tablet Take 10 mg by mouth       IRON, FERROUS SULFATE, PO Take by mouth daily      metaxalone (SKELAXIN) 800 mg tablet Take 800 mg by mouth as needed       Naproxen Sodium (ALEVE) 220 MG CAPS Take by mouth      neomycin-polymyxin-hydrocortisone (CORTISPORIN) otic solution Administer 4 drops to the right ear every 6 (six) hours 10 mL 0    Pseudoephedrine HCl (SUDAFED PO) Take by mouth as needed      Pseudoephedrine-guaiFENesin (MUCINEX D PO) Take by mouth as needed      sildenafil (REVATIO) 20 mg tablet Take 20 mg by mouth      traMADol (ULTRAM) 50 mg tablet TK 1 T PO UP TO TID QD PRN  0    vitamin B-12 (CYANOCOBALAMIN) 100 MCG TABS Take 1,000 mcg by mouth daily      zolpidem (AMBIEN) 10 mg tablet Take 10 mg by mouth 1/2 to 1 PRN      atorvastatin (LIPITOR) 20 mg tablet Take 1 tablet (20 mg total) by mouth daily 30 tablet 3    finasteride (PROSCAR) 5 mg tablet Take 1 tablet (5 mg total) by mouth daily 30 tablet 3    methylPREDNISolone 4 MG tablet therapy pack FPD      mometasone (NASONEX) 50 mcg/act nasal spray 1 spray into each nostril as needed (nasal congestion) 17 g 3    tamsulosin (FLOMAX) 0 4 mg Take 1 capsule (0 4 mg total) by mouth daily 30 capsule 3     No current facility-administered medications for this visit  Objective:    /86 (BP Location: Right arm, Patient Position: Sitting, Cuff Size: Standard)   Pulse 75   Temp 97 9 °F (36 6 °C) (Temporal)   Resp 12   Ht 5' 7" (1 702 m)   Wt 69 9 kg (154 lb)   SpO2 99%   BMI 24 12 kg/m²        Physical Exam   Constitutional: He is oriented to person, place, and time  He appears well-developed  HENT:   Head: Normocephalic and atraumatic  Eyes: Pupils are equal, round, and reactive to light  Conjunctivae are normal  Right eye exhibits no discharge  Left eye exhibits no discharge  Neck: Neck supple  No JVD present  No thyromegaly present  Cardiovascular: Normal rate, regular rhythm and normal heart sounds  No murmur heard  Pulmonary/Chest: Effort normal and breath sounds normal  He has no wheezes  He has no rales  Abdominal: Soft  Bowel sounds are normal  He exhibits no mass  There is no abdominal tenderness  There is no rebound and no guarding  Musculoskeletal: Normal range of motion  General: No tenderness or deformity  Comments: MILD DJD CHANGES     Lymphadenopathy:     He has no cervical adenopathy  Neurological: He is alert and oriented to person, place, and time  Skin: Skin is warm and dry  No rash noted  No erythema     Psychiatric: His behavior is normal  Judgment and thought content normal               Santiago Kirk MD Pt presenting with palpitations, tachycardia, hypertension. Pt has a hx of AFIB, and has a pacemaker. Denies any chest pain or SOB, has normal unlabored respirations, no vomiting or diarrhea. Pt symptoms have improved since arriving. Labs sent and meds given as per MD orders. L20 placed, dry and intact, flushes without difficulty.

## 2024-03-15 NOTE — PROGRESS NOTE ADULT - ASSESSMENT
84 yo M w/ PMHX of NSTEMI, RCA stent, LV systolic dysfunction, labile hypertension, PAF, PPM and h/o AAA repair w/ repair in sept 2019, bladder cancer on Keytruda, Hypothyroidism, HLD presents to the ED w/ palpitations that started at 9pm last night. Admit for elevated trops.  84yo M w/ PMH of NSTEMI, s/p RCA stent, HFrEF, CKD3, labile hypertension, paroxysmal afib (on Eliquis), PPM and h/o AAA repair w/ repair in Sept 2019, bladder cancer on Keytruda, Hypothyroidism, HLD presents to the ED w/ palpitations that started at 9pm last night a/w palpitations, SHARRON on CKD3, elevated troponin, hypertensive urgency.

## 2024-03-15 NOTE — PROGRESS NOTE ADULT - PROBLEM SELECTOR PLAN 1
- EKG ventricular paced and no ischemic changes   - Trops trended to peak, like in the setting of demand from afib  - S/P ASA 324mg PO OD   - Continue ASA 81mg po   - TTE ordered, f/u   - Cardiology (Dr. Beck) consulted, f/u recs - Trops mildly elevated and trended to peak, suspected demand ischemia due to palpitations (possibly afib w/ RVR at home)  - S/P ASA 324mg PO x1 in ED  - EKG ventricular paced and no acute ischemic changes   - Continue ASA 81mg po daily  - c/w lipitor 40mg po QHS  - TTE ordered, f/up   - Cardiology (Dr. Beck / Dr. Blevins group), recs appreciated

## 2024-03-15 NOTE — PATIENT PROFILE ADULT - FALL HARM RISK - HARM RISK INTERVENTIONS

## 2024-03-15 NOTE — PATIENT PROFILE ADULT - FUNCTIONAL ASSESSMENT - BASIC MOBILITY 6.
4-calculated by average /Not able to assess (calculate score using [facility] averaging method)

## 2024-03-15 NOTE — H&P ADULT - ASSESSMENT
82 yo M w/ PMHX of NSTEMI, RCA stent, LV systolic dysfunction, labile hypertension, PAF, PPM and h/o AAA repair w/ repair in sept 2019, bladder cancer on Keytruda, Hypothyroidism, HLD presents to the ED w/ palpitations that started at 9pm last night. Admit for elevated trops.

## 2024-03-15 NOTE — DISCHARGE NOTE PROVIDER - CARE PROVIDERS DIRECT ADDRESSES
[email or web address]

## 2024-03-16 VITALS
HEART RATE: 81 BPM | RESPIRATION RATE: 18 BRPM | OXYGEN SATURATION: 92 % | SYSTOLIC BLOOD PRESSURE: 185 MMHG | TEMPERATURE: 98 F | DIASTOLIC BLOOD PRESSURE: 50 MMHG

## 2024-03-16 DIAGNOSIS — N18.9 CHRONIC KIDNEY DISEASE, UNSPECIFIED: ICD-10-CM

## 2024-03-16 LAB
ALBUMIN SERPL ELPH-MCNC: 2.9 G/DL — LOW (ref 3.3–5)
ALP SERPL-CCNC: 56 U/L — SIGNIFICANT CHANGE UP (ref 40–120)
ALT FLD-CCNC: 15 U/L — SIGNIFICANT CHANGE UP (ref 12–78)
ANION GAP SERPL CALC-SCNC: 10 MMOL/L — SIGNIFICANT CHANGE UP (ref 5–17)
AST SERPL-CCNC: 11 U/L — LOW (ref 15–37)
BASOPHILS # BLD AUTO: 0.02 K/UL — SIGNIFICANT CHANGE UP (ref 0–0.2)
BASOPHILS NFR BLD AUTO: 0.4 % — SIGNIFICANT CHANGE UP (ref 0–2)
BILIRUB SERPL-MCNC: 0.3 MG/DL — SIGNIFICANT CHANGE UP (ref 0.2–1.2)
BUN SERPL-MCNC: 49 MG/DL — HIGH (ref 7–23)
CALCIUM SERPL-MCNC: 9.2 MG/DL — SIGNIFICANT CHANGE UP (ref 8.5–10.1)
CHLORIDE SERPL-SCNC: 112 MMOL/L — HIGH (ref 96–108)
CO2 SERPL-SCNC: 24 MMOL/L — SIGNIFICANT CHANGE UP (ref 22–31)
CREAT ?TM UR-MCNC: 37 MG/DL — SIGNIFICANT CHANGE UP
CREAT SERPL-MCNC: 1.8 MG/DL — HIGH (ref 0.5–1.3)
EGFR: 37 ML/MIN/1.73M2 — LOW
EOSINOPHIL # BLD AUTO: 0.07 K/UL — SIGNIFICANT CHANGE UP (ref 0–0.5)
EOSINOPHIL NFR BLD AUTO: 1.5 % — SIGNIFICANT CHANGE UP (ref 0–6)
GLUCOSE SERPL-MCNC: 107 MG/DL — HIGH (ref 70–99)
HCT VFR BLD CALC: 26.5 % — LOW (ref 39–50)
HGB BLD-MCNC: 8.6 G/DL — LOW (ref 13–17)
IMM GRANULOCYTES NFR BLD AUTO: 0.2 % — SIGNIFICANT CHANGE UP (ref 0–0.9)
LYMPHOCYTES # BLD AUTO: 1.23 K/UL — SIGNIFICANT CHANGE UP (ref 1–3.3)
LYMPHOCYTES # BLD AUTO: 26.8 % — SIGNIFICANT CHANGE UP (ref 13–44)
MAGNESIUM SERPL-MCNC: 2.1 MG/DL — SIGNIFICANT CHANGE UP (ref 1.6–2.6)
MCHC RBC-ENTMCNC: 28 PG — SIGNIFICANT CHANGE UP (ref 27–34)
MCHC RBC-ENTMCNC: 32.5 GM/DL — SIGNIFICANT CHANGE UP (ref 32–36)
MCV RBC AUTO: 86.3 FL — SIGNIFICANT CHANGE UP (ref 80–100)
MONOCYTES # BLD AUTO: 0.52 K/UL — SIGNIFICANT CHANGE UP (ref 0–0.9)
MONOCYTES NFR BLD AUTO: 11.3 % — SIGNIFICANT CHANGE UP (ref 2–14)
NEUTROPHILS # BLD AUTO: 2.74 K/UL — SIGNIFICANT CHANGE UP (ref 1.8–7.4)
NEUTROPHILS NFR BLD AUTO: 59.8 % — SIGNIFICANT CHANGE UP (ref 43–77)
NRBC # BLD: 0 /100 WBCS — SIGNIFICANT CHANGE UP (ref 0–0)
OSMOLALITY UR: 202 MOSM/KG — SIGNIFICANT CHANGE UP (ref 50–1200)
PHOSPHATE SERPL-MCNC: 3.6 MG/DL — SIGNIFICANT CHANGE UP (ref 2.5–4.5)
PLATELET # BLD AUTO: 156 K/UL — SIGNIFICANT CHANGE UP (ref 150–400)
POTASSIUM SERPL-MCNC: 3.9 MMOL/L — SIGNIFICANT CHANGE UP (ref 3.5–5.3)
POTASSIUM SERPL-SCNC: 3.9 MMOL/L — SIGNIFICANT CHANGE UP (ref 3.5–5.3)
POTASSIUM UR-SCNC: 11.8 MMOL/L — SIGNIFICANT CHANGE UP
PROT ?TM UR-MCNC: 6 MG/DL — SIGNIFICANT CHANGE UP (ref 0–12)
PROT SERPL-MCNC: 6.5 G/DL — SIGNIFICANT CHANGE UP (ref 6–8.3)
PROT/CREAT UR-RTO: 0.2 RATIO — SIGNIFICANT CHANGE UP (ref 0–0.2)
RBC # BLD: 3.07 M/UL — LOW (ref 4.2–5.8)
RBC # FLD: 16.2 % — HIGH (ref 10.3–14.5)
SODIUM SERPL-SCNC: 146 MMOL/L — HIGH (ref 135–145)
SODIUM UR-SCNC: <20 MMOL/L — SIGNIFICANT CHANGE UP
T3 SERPL-MCNC: 108 NG/DL — SIGNIFICANT CHANGE UP (ref 80–200)
T4 AB SER-ACNC: 17.6 UG/DL — HIGH (ref 4.6–12)
T4 FREE SERPL-MCNC: 5.1 NG/DL — HIGH (ref 0.9–1.8)
UUN UR-MCNC: 374 MG/DL — SIGNIFICANT CHANGE UP
WBC # BLD: 4.59 K/UL — SIGNIFICANT CHANGE UP (ref 3.8–10.5)
WBC # FLD AUTO: 4.59 K/UL — SIGNIFICANT CHANGE UP (ref 3.8–10.5)

## 2024-03-16 PROCEDURE — 84156 ASSAY OF PROTEIN URINE: CPT

## 2024-03-16 PROCEDURE — 93306 TTE W/DOPPLER COMPLETE: CPT

## 2024-03-16 PROCEDURE — 84443 ASSAY THYROID STIM HORMONE: CPT

## 2024-03-16 PROCEDURE — 85027 COMPLETE CBC AUTOMATED: CPT

## 2024-03-16 PROCEDURE — 80061 LIPID PANEL: CPT

## 2024-03-16 PROCEDURE — 85610 PROTHROMBIN TIME: CPT

## 2024-03-16 PROCEDURE — 82570 ASSAY OF URINE CREATININE: CPT

## 2024-03-16 PROCEDURE — 99285 EMERGENCY DEPT VISIT HI MDM: CPT | Mod: 25

## 2024-03-16 PROCEDURE — 85730 THROMBOPLASTIN TIME PARTIAL: CPT

## 2024-03-16 PROCEDURE — 83036 HEMOGLOBIN GLYCOSYLATED A1C: CPT

## 2024-03-16 PROCEDURE — 83935 ASSAY OF URINE OSMOLALITY: CPT

## 2024-03-16 PROCEDURE — 84540 ASSAY OF URINE/UREA-N: CPT

## 2024-03-16 PROCEDURE — 36415 COLL VENOUS BLD VENIPUNCTURE: CPT

## 2024-03-16 PROCEDURE — 93642 EP EVL 1/2CHMB TRNSVNS CVDFB: CPT

## 2024-03-16 PROCEDURE — 80053 COMPREHEN METABOLIC PANEL: CPT

## 2024-03-16 PROCEDURE — 93005 ELECTROCARDIOGRAM TRACING: CPT

## 2024-03-16 PROCEDURE — 85025 COMPLETE CBC W/AUTO DIFF WBC: CPT

## 2024-03-16 PROCEDURE — 81001 URINALYSIS AUTO W/SCOPE: CPT

## 2024-03-16 PROCEDURE — 84100 ASSAY OF PHOSPHORUS: CPT

## 2024-03-16 PROCEDURE — 84481 FREE ASSAY (FT-3): CPT

## 2024-03-16 PROCEDURE — 84300 ASSAY OF URINE SODIUM: CPT

## 2024-03-16 PROCEDURE — 84484 ASSAY OF TROPONIN QUANT: CPT

## 2024-03-16 PROCEDURE — 84133 ASSAY OF URINE POTASSIUM: CPT

## 2024-03-16 PROCEDURE — 84439 ASSAY OF FREE THYROXINE: CPT

## 2024-03-16 PROCEDURE — 80162 ASSAY OF DIGOXIN TOTAL: CPT

## 2024-03-16 PROCEDURE — 83735 ASSAY OF MAGNESIUM: CPT

## 2024-03-16 PROCEDURE — 99239 HOSP IP/OBS DSCHRG MGMT >30: CPT

## 2024-03-16 PROCEDURE — 82553 CREATINE MB FRACTION: CPT

## 2024-03-16 PROCEDURE — 82550 ASSAY OF CK (CPK): CPT

## 2024-03-16 PROCEDURE — 84480 ASSAY TRIIODOTHYRONINE (T3): CPT

## 2024-03-16 PROCEDURE — 84436 ASSAY OF TOTAL THYROXINE: CPT

## 2024-03-16 RX ORDER — LEVOTHYROXINE SODIUM 125 MCG
1 TABLET ORAL
Refills: 0 | DISCHARGE

## 2024-03-16 RX ADMIN — CARVEDILOL PHOSPHATE 25 MILLIGRAM(S): 80 CAPSULE, EXTENDED RELEASE ORAL at 05:55

## 2024-03-16 RX ADMIN — Medication 100 MICROGRAM(S): at 05:56

## 2024-03-16 RX ADMIN — PANTOPRAZOLE SODIUM 40 MILLIGRAM(S): 20 TABLET, DELAYED RELEASE ORAL at 05:56

## 2024-03-16 RX ADMIN — APIXABAN 5 MILLIGRAM(S): 2.5 TABLET, FILM COATED ORAL at 05:55

## 2024-03-16 RX ADMIN — AMIODARONE HYDROCHLORIDE 100 MILLIGRAM(S): 400 TABLET ORAL at 05:56

## 2024-03-16 RX ADMIN — FINASTERIDE 5 MILLIGRAM(S): 5 TABLET, FILM COATED ORAL at 11:50

## 2024-03-16 RX ADMIN — CALCITRIOL 0.25 MICROGRAM(S): 0.5 CAPSULE ORAL at 11:50

## 2024-03-16 RX ADMIN — Medication 81 MILLIGRAM(S): at 11:50

## 2024-03-16 RX ADMIN — AMLODIPINE BESYLATE 10 MILLIGRAM(S): 2.5 TABLET ORAL at 05:56

## 2024-03-16 NOTE — CONSULT NOTE ADULT - SUBJECTIVE AND OBJECTIVE BOX
Patient is a 83y old  Male who presents with a chief complaint of Palpitations (16 Mar 2024 09:44)      Reason For Consult:     HPI:  82 yo M w/ PMHX of NSTEMI, RCA stent, LV systolic dysfunction, labile hypertension, PAF, PPM and h/o AAA repair w/ repair in sept 2019, bladdercancer on Keytruda, Hypothyroidism, HLD presents to the ED w/ palpitations that started at 9pm last night. He also admitted to feeling nauseous at the same time he was having palpitations. He presented to the hospital and his HR was noted to be in the 100s. EKG showed ventricular paced with HR of 70 and trops were elevated but ekg showed no ischemic changes and denies chest pain.     In the ED,   Vitals: T: 97.9, HR: 108, BP: 191/79, RR: 18, O2: 98% on RA   EKG: Ventricular Paced HR 70 bpm   Significant labs: Hgb: 9.5, INR: 1.19, Trops 292-->272, Cr/BUN: 67/2.20, CPK: 4.1, Digoxin: 0.4   Imaging: None   Given: ASA 324mg PO x1, Coreg 25mg PO OD  (15 Mar 2024 01:34)      PAST MEDICAL & SURGICAL HISTORY:  HTN (hypertension)      Atrial fibrillation      Hypercholesterolemia      Pacemaker      Bladder cancer          FAMILY HISTORY:        Social History:    MEDICATIONS  (STANDING):  amLODIPine   Tablet 10 milliGRAM(s) Oral daily  apixaban 5 milliGRAM(s) Oral two times a day  aspirin  chewable 81 milliGRAM(s) Oral daily  atorvastatin 40 milliGRAM(s) Oral at bedtime  calcitriol   Capsule 0.25 MICROGram(s) Oral daily  carvedilol 25 milliGRAM(s) Oral every 12 hours  digoxin     Tablet 125 MICROGram(s) Oral <User Schedule>  finasteride 5 milliGRAM(s) Oral daily  pantoprazole    Tablet 40 milliGRAM(s) Oral before breakfast  sodium chloride 0.45%. 1000 milliLiter(s) (75 mL/Hr) IV Continuous <Continuous>    MEDICATIONS  (PRN):        T(C): 36.8 (03-16-24 @ 11:42), Max: 36.8 (03-16-24 @ 11:42)  HR: 68 (03-16-24 @ 11:42) (68 - 73)  BP: 160/54 (03-16-24 @ 11:42) (157/77 - 177/71)  RR: 18 (03-16-24 @ 11:42) (18 - 18)  SpO2: 95% (03-16-24 @ 11:42) (92% - 95%)  Wt(kg): --    PHYSICAL EXAM:  CHEST/LUNG: Clear to percussion bilaterally; No rales, rhonchi, wheezing, or rubs  HEART: Regular rate and rhythm; No murmurs, rubs, or gallops  ABDOMEN: Soft, Nontender, Nondistended; Bowel sounds present  EXTREMITIES:  2+ Peripheral Pulses, No clubbing, cyanosis, or edema  SKIN: No rashes or lesions    CAPILLARY BLOOD GLUCOSE                                8.6    4.59  )-----------( 156      ( 16 Mar 2024 06:25 )             26.5       CMP:  03-16 @ 06:25  SGPT 15  Albumin 2.9   Alk Phos 56   Anion Gap 10   SGOT 11   Total Bili 0.3   BUN 49   Calcium Total 9.2   CO2 24   Chloride 112   Creatinine 1.80   eGFR if AA --   eGFR if non AA --   Glucose 107   Potassium 3.9   Protein 6.5   Sodium 146      Thyroid Function Tests:  03-16 @ 06:25 TSH -- FreeT4 5.1 T3 -- Anti TPO -- Anti Thyroglobulin Ab -- TSI --  03-15 @ 14:15 TSH -- FreeT4 -- T3 108 Anti TPO -- Anti Thyroglobulin Ab -- TSI --      Diabetes Tests:       Radiology:               
[facility] Cardiology    CHIEF COMPLAINT: Patient is a 83y old  Male who presents with a chief complaint of Palpitations (15 Mar 2024 09:57)      HPI:  84 yo M w/ PMHX of NSTEMI, RCA stent, LV systolic dysfunction, labile hypertension, PAF, PPM and h/o AAA repair w/ repair in sept 2019, bladdercancer on Keytruda, Hypothyroidism, HLD presents to the ED w/ palpitations that started at 9pm last night. He also admitted to feeling nauseous at the same time he was having palpitations. He presented to the hospital and his HR was noted to be in the 100s. EKG showed ventricular paced with HR of 70 and trops were elevated but ekg showed no ischemic changes and denies chest pain.     In the ED,   Vitals: T: 97.9, HR: 108, BP: 191/79, RR: 18, O2: 98% on RA   EKG: Ventricular Paced HR 70 bpm   Significant labs: Hgb: 9.5, INR: 1.19, Trops 292-->272, Cr/BUN: 67/2.20, CPK: 4.1, Digoxin: 0.4   Imaging: None   Given: ASA 324mg PO x1, Coreg 25mg PO OD  (15 Mar 2024 01:34)    PAST MEDICAL & SURGICAL HISTORY:  HTN (hypertension)      Atrial fibrillation      Hypercholesterolemia      Pacemaker      Bladder cancer        SOCIAL HISTORY: no tobacco  FAMILY HISTORY:   HTN  MEDICATIONS  (STANDING):  aMIOdarone    Tablet 100 milliGRAM(s) Oral daily  amLODIPine   Tablet 10 milliGRAM(s) Oral daily  apixaban 5 milliGRAM(s) Oral two times a day  aspirin  chewable 81 milliGRAM(s) Oral daily  atorvastatin 40 milliGRAM(s) Oral at bedtime  calcitriol   Capsule 0.25 MICROGram(s) Oral daily  carvedilol 25 milliGRAM(s) Oral every 12 hours  digoxin     Tablet 125 MICROGram(s) Oral <User Schedule>  finasteride 5 milliGRAM(s) Oral daily  levothyroxine 100 MICROGram(s) Oral daily  pantoprazole    Tablet 40 milliGRAM(s) Oral before breakfast  sodium chloride 0.45%. 1000 milliLiter(s) (75 mL/Hr) IV Continuous <Continuous>    MEDICATIONS  (PRN):    Allergies    iodine (Rash)    Intolerances        REVIEW OF SYSTEMS:  CONSTITUTIONAL: No weakness, no fevers   EYES/ENT: No visual changes  NECK: No pain or stiffness  RESPIRATORY: No shortness of breath  CARDIOVASCULAR: No chest pain or palpitations  GASTROINTESTINAL: No abdominal pain  GENITOURINARY: No hematuria  NEUROLOGICAL: No weakness  SKIN: No rash  All other review of systems is negative unless indicated above    VITAL SIGNS:   Vital Signs Last 24 Hrs  T(C): 37 (15 Mar 2024 07:37), Max: 37 (15 Mar 2024 07:37)  T(F): 98.6 (15 Mar 2024 07:37), Max: 98.6 (15 Mar 2024 07:37)  HR: 73 (15 Mar 2024 17:10) (73 - 108)  BP: 177/71 (15 Mar 2024 17:10) (148/69 - 191/79)  BP(mean): --  RR: 16 (15 Mar 2024 07:37) (16 - 18)  SpO2: 97% (15 Mar 2024 07:37) (95% - 98%)    Parameters below as of 15 Mar 2024 07:37  Patient On (Oxygen Delivery Method): room air      I&O's Summary    PHYSICAL EXAM:  Constitutional: NAD  Neurological: Alert and oriented  HEENT: EOMI, no JVD  Cardiovascular: S1 and S2, no murmur  Pulmonary: breath sounds bilaterally  Gastrointestinal: Bowel Sounds present, soft, nontender  Ext: tr peripheral edema  Skin: No rashes, No cyanosis.  Psych:  Mood calm    LABS: All Labs Reviewed:                        8.7    5.45  )-----------( 147      ( 15 Mar 2024 04:26 )             26.8     03-15    146<H>  |  112<H>  |  63<H>  ----------------------------<  99  4.4   |  25  |  2.20<H>    Ca    9.0      15 Mar 2024 04:26  Phos  3.6     03-14  Mg     2.0     03-14    TPro  6.5  /  Alb  3.0<L>  /  TBili  0.3  /  DBili  x   /  AST  13<L>  /  ALT  15  /  AlkPhos  56  03-15    PT/INR - ( 14 Mar 2024 23:47 )   PT: 13.8 sec;   INR: 1.19 ratio         PTT - ( 14 Mar 2024 23:47 )  PTT:34.7 sec  CARDIAC MARKERS ( 15 Mar 2024 04:26 )  x     / x     / 48 U/L / x     / 1.7 ng/mL  CARDIAC MARKERS ( 15 Mar 2024 00:42 )  x     / x     / 54 U/L / x     / 2.2 ng/mL    If no  12 Lead ECG:   Ventricular Rate 70 BPM    QRS Duration 138 ms    Q-T Interval 398 ms    QTC Calculation(Bazett) 429 ms    R Axis -51 degrees    T Axis 92 degrees    Diagnosis Line   Confirmed by MALIHA DECKER ([id number]) on 3/15/2024 2:05:54 PM (03-14-24 @ 23:26)      84 yo M w/ PMHX of NSTEMI, RCA stent, LV systolic dysfunction, labile hypertension, PAF, PPM and h/o AAA repair w/ repair in sept 2019, bladdercancer on Keytruda, Hypothyroidism, HLD presents to the ED w/ palpitations that started at 9pm last night. He also admitted to feeling nauseous at the same time he was having palpitations. He presented to the hospital and his HR was noted to be in the 100s. EKG showed ventricular paced with HR of 70 and trops were elevated but ekg showed no ischemic changes and denies chest pain.     In the ED,   Vitals: T: 97.9, HR: 108, BP: 191/79, RR: 18, O2: 98% on RA   EKG: Ventricular Paced HR 70 bpm   Significant labs: Hgb: 9.5, INR: 1.19, Trops 292-->272, Cr/BUN: 67/2.20, CPK: 4.1, Digoxin: 0.4   Imaging: None   Given: ASA 324mg PO x1, Coreg 25mg PO OD  (15 Mar 2024 01:34)    [facility] called to interrogate PPM  check ECHO, prior LVEF 45%  Demand ischemia likely from tachycardia, anemia, CKD  pt denies CP or SOB  pt sees Dr Beck outpt, will need outpt nuclear stress test.  if no VT on PPM check, would stop AMIO given thyroid abnormality/TSH    
Patient is a 83y old  Male who presents with a chief complaint of Palpitations (15 Mar 2024 19:10)       HPI:  84 yo M w/ PMHX of NSTEMI, RCA stent, LV systolic dysfunction, labile hypertension, PAF, PPM and h/o AAA repair w/ repair in 2019, bladdercancer on Keytruda, Hypothyroidism, HLD presents to the ED w/ palpitations that started at 9pm last night. He also admitted to feeling nauseous at the same time he was having palpitations. He presented to the hospital and his HR was noted to be in the 100s. EKG showed ventricular paced with HR of 70 and trops were elevated but ekg showed no ischemic changes and denies chest pain.   Patient has nephrologist that he follows with Dr. Love.  Saw her recently and reports creatinine to be 1.8.   He denies any N/V/SOB/dysuria.  He states he accidentally took an extra dose of lasix.      PAST MEDICAL & SURGICAL HISTORY:  HTN (hypertension)      Atrial fibrillation      Hypercholesterolemia      Pacemaker      Bladder cancer           FAMILY HISTORY:  [place]    Social History:Non smoker    MEDICATIONS  (STANDING):  aMIOdarone    Tablet 100 milliGRAM(s) Oral daily  amLODIPine   Tablet 10 milliGRAM(s) Oral daily  apixaban 5 milliGRAM(s) Oral two times a day  aspirin  chewable 81 milliGRAM(s) Oral daily  atorvastatin 40 milliGRAM(s) Oral at bedtime  calcitriol   Capsule 0.25 MICROGram(s) Oral daily  carvedilol 25 milliGRAM(s) Oral every 12 hours  digoxin     Tablet 125 MICROGram(s) Oral <User Schedule>  finasteride 5 milliGRAM(s) Oral daily  levothyroxine 100 MICROGram(s) Oral daily  pantoprazole    Tablet 40 milliGRAM(s) Oral before breakfast  sodium chloride 0.45%. 1000 milliLiter(s) (75 mL/Hr) IV Continuous <Continuous>    MEDICATIONS  (PRN):   Meds reviewed    Allergies    iodine (Rash)    Intolerances         REVIEW OF SYSTEMS:    Review of Systems:   Constitutional: Denies fatigue  HEENT: Denies headaches and dizziness  Respiratory: denies SOB, cough, or wheezing  Cardiovascular: denies CP, palpitations  Gastrointestinal: Denies nausea, denies vomiting, diarrhea, constipation, abdominal pain, or bloody stools  Genitourinary: denies painful urination, increased frequency, urgency, or bloody urine  Skin: denies rashes or itching  Musculoskeletal: denies muscle aches, joint swelling  Neurologic: Denies generalized weakness, denies loss of sensation, numbness, or tingling      Vital Signs Last 24 Hrs  T(C): 37 (15 Mar 2024 07:37), Max: 37 (15 Mar 2024 07:37)  T(F): 98.6 (15 Mar 2024 07:37), Max: 98.6 (15 Mar 2024 07:37)  HR: 73 (15 Mar 2024 17:10) (73 - 108)  BP: 177/71 (15 Mar 2024 17:10) (148/69 - 191/79)  BP(mean): --  RR: 16 (15 Mar 2024 07:37) (16 - 18)  SpO2: 97% (15 Mar 2024 07:37) (95% - 98%)    Parameters below as of 15 Mar 2024 07:37  Patient On (Oxygen Delivery Method): room air      Daily Height in cm: 185.42 (14 Mar 2024 23:08)    Daily     PHYSICAL EXAM:    GENERAL: NAD  HEAD:  Atraumatic, Normocephalic  EYES: EOMI, conjunctiva and sclera clear  ENMT: No Drainage from nares, No drainage from ears  NERVOUS SYSTEM:  Awake and Alert  CHEST/LUNG: Clear to percussion bilaterally  EXTREMITIES:  No Edema  SKIN: No rashes No obvious ecchymosis      LABS:                        8.7    5.45  )-----------( 147      ( 15 Mar 2024 04:26 )             26.8     03-15    146<H>  |  112<H>  |  63<H>  ----------------------------<  99  4.4   |  25  |  2.20<H>    Ca    9.0      15 Mar 2024 04:26  Phos  3.6     03-14  Mg     2.0     03-14    TPro  6.5  /  Alb  3.0<L>  /  TBili  0.3  /  DBili  x   /  AST  13<L>  /  ALT  15  /  AlkPhos  56  03-15    PT/INR - ( 14 Mar 2024 23:47 )   PT: 13.8 sec;   INR: 1.19 ratio         PTT - ( 14 Mar 2024 23:47 )  PTT:34.7 sec  Urinalysis Basic - ( 15 Mar 2024 17:00 )    Color: Yellow / Appearance: Clear / S.006 / pH: x  Gluc: x / Ketone: Negative mg/dL  / Bili: Negative / Urobili: 0.2 mg/dL   Blood: x / Protein: Negative mg/dL / Nitrite: Negative   Leuk Esterase: Trace / RBC: 2 /HPF / WBC 8 /HPF   Sq Epi: x / Non Sq Epi: x / Bacteria: x      Magnesium: 2.0 mg/dL ( @ 23:47)  Phosphorus: 3.6 mg/dL ( @ 23:47)          RADIOLOGY & ADDITIONAL TESTS:

## 2024-03-16 NOTE — DISCHARGE NOTE NURSING/CASE MANAGEMENT/SOCIAL WORK - PATIENT PORTAL LINK FT
You can access the FollowMyHealth Patient Portal offered by [facility] by registering at the following website: [email or web address]. By joining [facility]’s FollowMyHealth portal, you will also be able to view your health information using other applications (apps) compatible with our system.

## 2024-03-16 NOTE — PROGRESS NOTE ADULT - REASON FOR ADMISSION
Elsa here in the clinic and was provided with negative lab results. Patient reports her symptoms are improving and will finish the course of Metronidazole. Patient verbalized understanding and had no further questions.    Palpitations

## 2024-03-16 NOTE — PROGRESS NOTE ADULT - SUBJECTIVE AND OBJECTIVE BOX
Patient is a 83y Male with a known history of :  Elevated troponin [R79.89]    Atrial fibrillation [I48.91]    Heart failure [I50.9]    CAD (coronary artery disease) [I25.10]    History of BPH [Z87.438]    Need for prophylactic measure [Z29.9]    Hypertension [I10]    Hypothyroidism [E03.9]    Bladder cancer [C67.9]    Low TSH level [R79.89]    GERD (gastroesophageal reflux disease) [K21.9]    SHARRON (acute kidney injury) [N17.9]      HPI:  84 yo M w/ PMHX of NSTEMI, RCA stent, LV systolic dysfunction, labile hypertension, PAF, PPM and h/o AAA repair w/ repair in sept 2019, bladdercancer on Keytruda, Hypothyroidism, HLD presents to the ED w/ palpitations that started at 9pm last night. He also admitted to feeling nauseous at the same time he was having palpitations. He presented to the hospital and his HR was noted to be in the 100s. EKG showed ventricular paced with HR of 70 and trops were elevated but ekg showed no ischemic changes and denies chest pain.     In the ED,   Vitals: T: 97.9, HR: 108, BP: 191/79, RR: 18, O2: 98% on RA   EKG: Ventricular Paced HR 70 bpm   Significant labs: Hgb: 9.5, INR: 1.19, Trops 292-->272, Cr/BUN: 67/2.20, CPK: 4.1, Digoxin: 0.4   Imaging: None   Given: ASA 324mg PO x1, Coreg 25mg PO OD  (15 Mar 2024 01:34)      REVIEW OF SYSTEMS:    CONSTITUTIONAL: No fever, weight loss, or fatigue  EYES: No eye pain, visual disturbances, or discharge  ENMT:  No difficulty hearing, tinnitus, vertigo; No sinus or throat pain  NECK: No pain or stiffness  BREASTS: No pain, masses, or nipple discharge  RESPIRATORY: No cough, wheezing, chills or hemoptysis; No shortness of breath  CARDIOVASCULAR: No chest pain, palpitations, dizziness, or leg swelling  GASTROINTESTINAL: No abdominal or epigastric pain. No nausea, vomiting, or hematemesis; No diarrhea or constipation. No melena or hematochezia.  GENITOURINARY: No dysuria, frequency, hematuria, or incontinence  NEUROLOGICAL: No headaches, memory loss, loss of strength, numbness, or tremors  SKIN: No itching, burning, rashes, or lesions   LYMPH NODES: No enlarged glands  ENDOCRINE: No heat or cold intolerance; No hair loss  MUSCULOSKELETAL: No joint pain or swelling; No muscle, back, or extremity pain  PSYCHIATRIC: No depression, anxiety, mood swings, or difficulty sleeping  HEME/LYMPH: No easy bruising, or bleeding gums  ALLERGY AND IMMUNOLOGIC: No hives or eczema    MEDICATIONS  (STANDING):  aMIOdarone    Tablet 100 milliGRAM(s) Oral daily  amLODIPine   Tablet 10 milliGRAM(s) Oral daily  apixaban 5 milliGRAM(s) Oral two times a day  aspirin  chewable 81 milliGRAM(s) Oral daily  atorvastatin 40 milliGRAM(s) Oral at bedtime  calcitriol   Capsule 0.25 MICROGram(s) Oral daily  carvedilol 25 milliGRAM(s) Oral every 12 hours  digoxin     Tablet 125 MICROGram(s) Oral <User Schedule>  finasteride 5 milliGRAM(s) Oral daily  pantoprazole    Tablet 40 milliGRAM(s) Oral before breakfast  sodium chloride 0.45%. 1000 milliLiter(s) (75 mL/Hr) IV Continuous <Continuous>    MEDICATIONS  (PRN):      ALLERGIES: iodine (Rash)      FAMILY HISTORY:      Social history:  Alochol:   Smoking:   Drug Use:   Marital Status:     PHYSICAL EXAMINATION:  -----------------------------  T(C): 36.7 (03-16-24 @ 05:06), Max: 36.7 (03-15-24 @ 21:07)  HR: 69 (03-16-24 @ 05:06) (69 - 73)  BP: 157/77 (03-16-24 @ 05:06) (157/77 - 177/71)  RR: 18 (03-16-24 @ 05:06) (18 - 18)  SpO2: 93% (03-16-24 @ 05:06) (92% - 93%)  Wt(kg): --        Constitutional: well developed, normal appearance, well groomed, well nourished, no deformities and no acute distress.   Eyes: the conjunctiva exhibited no abnormalities and the eyelids demonstrated no xanthelasmas.   HEENT: normal oral mucosa, no oral pallor and no oral cyanosis.   Neck: normal jugular venous A waves present, normal jugular venous V waves present and no jugular venous dobbs A waves.   Pulmonary: no respiratory distress, normal respiratory rhythm and effort, no accessory muscle use and lungs were clear to auscultation bilaterally. Anteriorly  Cardiovascular: heart rate and rhythm were normal, normal S1 and S2 and no murmur, gallop, rub, heave or thrill are present.   Musculoskeletal: the gait could not be assessed.   Extremities: no clubbing of the fingernails, no localized cyanosis, no petechial hemorrhages and no ischemic changes.   Skin: normal skin color and pigmentation, no rash, no venous stasis, no skin lesions, no skin ulcer and no xanthoma was observed.   Psychiatric: oriented to person, place, and time, the affect was normal, the mood was normal and not feeling anxious.     LABS:   --------  03-16    146<H>  |  112<H>  |  49<H>  ----------------------------<  107<H>  3.9   |  24  |  1.80<H>    Ca    9.2      16 Mar 2024 06:25  Phos  3.6     03-16  Mg     2.1     03-16    TPro  6.5  /  Alb  2.9<L>  /  TBili  0.3  /  DBili  x   /  AST  11<L>  /  ALT  15  /  AlkPhos  56  03-16                         8.6    4.59  )-----------( 156      ( 16 Mar 2024 06:25 )             26.5     PT/INR - ( 14 Mar 2024 23:47 )   PT: 13.8 sec;   INR: 1.19 ratio         PTT - ( 14 Mar 2024 23:47 )  PTT:34.7 sec      100 mg/dL, --, 40 mg/dL<L>, 87 mg/dL          Radiology:

## 2024-03-16 NOTE — PROGRESS NOTE ADULT - PROBLEM SELECTOR PLAN 7
Home med: Levothyroxine 100mcg  - TSH low, T4 elevated, normal T3 on admission  - Levothyroxine decreased to 88mcg   - Cardio recommending holding Amiodarone given thyroid dysfunction, but patient would like to confirm with Dr. Beck, his outpatient Cardiologist first  - Endo (Dr. Perlman) recommending repeat TFTs, possible prednisone/methimazole and Thyroid US outpatient Home med: Levothyroxine 100mcg  - TSH low, T4 elevated, normal T3 on admission  - Levothyroxine decreased to 88mcg, will dc per Endocrine  - Cardio recommending holding Amiodarone given thyroid dysfunction, but patient would like to confirm with Dr. Beck, his outpatient Cardiologist first  - Endo (Dr. Perlman) recommending repeat TFTs outpatient, possible prednisone/methimazole and Thyroid US outpatient

## 2024-03-16 NOTE — PROGRESS NOTE ADULT - PROBLEM SELECTOR PLAN 3
Chronic systolic CHF  - pt somewhat hypovolemic at this time  - awaiting TTE read  - Hold home lasix for now   - Continue home Carvedilol, and Digoxin

## 2024-03-16 NOTE — PROGRESS NOTE ADULT - PROBLEM SELECTOR PLAN 5
Hypertensive urgency on admission -- BP now improved  - has documentation that pt had suspected white coat HTN, as well, on outpt records; labile BP  - Continue home amlodipine and coreg  - monitor VS

## 2024-03-16 NOTE — CONSULT NOTE ADULT - PROBLEM SELECTOR RECOMMENDATION 9
?amio-induced thyrotoxicosis vs. due to exogenous lt4  t4 disproportionately higher than t3 level - ?amio-induced thyroiditis  monitor off lt4  repeat tfts including ft4;  to check thyroid US outpt  may benefit from prednisone/methimazole with close monitoring of tfts  case discussed at length with wife at bedside  provided info to f/u outpt in 1-2 weeks

## 2024-03-16 NOTE — DISCHARGE NOTE NURSING/CASE MANAGEMENT/SOCIAL WORK - NSDCVIVACCINE_GEN_ALL_CORE_FT
Tdap; 14-Dec-2018 17:36; Petra Miranda (LARA); [employer]; [id number] (Exp. Date: 03-Dec-2020); IntraMuscular; Deltoid Right.; 0.5 milliLiter(s); VIS (VIS Published: 09-May-2013, VIS Presented: 14-Dec-2018);

## 2024-03-16 NOTE — PROGRESS NOTE ADULT - ASSESSMENT
84yo M w/ PMH of NSTEMI, s/p RCA stent, HFrEF, CKD3, labile hypertension, paroxysmal afib (on Eliquis), PPM and h/o AAA repair w/ repair in Sept 2019, bladder cancer on Keytruda, Hypothyroidism, HLD presents to the ED w/ palpitations that started at 9pm last night a/w palpitations, SHARRON on CKD3, elevated troponin, hypertensive urgency. none

## 2024-03-16 NOTE — PROGRESS NOTE ADULT - ASSESSMENT
SHARRON on CKD 3  Hypernatremia  HTN  Afib     -Baseline Creatinine 1.8 per patient  -SHARRON likely 2/2 dehydration, accidentally took extra dose of lasix. Stabilizing   -Urine studies reviewed. Low FeNa. UA does not suggest GN  -Started gentle hypotonic IVF  -Check mag/phos in AM  -Hold lasix for now  -Awaiting today's lab results    d/w primary    3/15/24-D/w wife

## 2024-03-16 NOTE — PROGRESS NOTE ADULT - SUBJECTIVE AND OBJECTIVE BOX
Patient is a 83y old  Male who presents with a chief complaint of Palpitations (15 Mar 2024 19:10)       HPI:  84 yo M w/ PMHX of NSTEMI, RCA stent, LV systolic dysfunction, labile hypertension, PAF, PPM and h/o AAA repair w/ repair in 2019, bladder cancer on Keytruda, Hypothyroidism, HLD presents to the ED w/ palpitations that started at 9pm last night. He also admitted to feeling nauseous at the same time he was having palpitations. He presented to the hospital and his HR was noted to be in the 100s. EKG showed ventricular paced with HR of 70 and trops were elevated but ekg showed no ischemic changes and denies chest pain.   Patient has nephrologist that he follows with Dr. Love.  Saw her recently and reports creatinine to be 1.8.   He denies any N/V/SOB/dysuria.  He states he accidentally took an extra dose of lasix.      PAST MEDICAL & SURGICAL HISTORY:  HTN (hypertension)      Atrial fibrillation      Hypercholesterolemia      Pacemaker      Bladder cancer           FAMILY HISTORY:  NC    Social History:Non smoker    MEDICATIONS  (STANDING):  aMIOdarone    Tablet 100 milliGRAM(s) Oral daily  amLODIPine   Tablet 10 milliGRAM(s) Oral daily  apixaban 5 milliGRAM(s) Oral two times a day  aspirin  chewable 81 milliGRAM(s) Oral daily  atorvastatin 40 milliGRAM(s) Oral at bedtime  calcitriol   Capsule 0.25 MICROGram(s) Oral daily  carvedilol 25 milliGRAM(s) Oral every 12 hours  digoxin     Tablet 125 MICROGram(s) Oral <User Schedule>  finasteride 5 milliGRAM(s) Oral daily  levothyroxine 100 MICROGram(s) Oral daily  pantoprazole    Tablet 40 milliGRAM(s) Oral before breakfast  sodium chloride 0.45%. 1000 milliLiter(s) (75 mL/Hr) IV Continuous <Continuous>    MEDICATIONS  (PRN):   Meds reviewed    Allergies    iodine (Rash)    Intolerances         REVIEW OF SYSTEMS:    Review of Systems:   Constitutional: Denies fatigue  HEENT: Denies headaches and dizziness  Respiratory: denies SOB, cough, or wheezing  Cardiovascular: denies CP, palpitations  Gastrointestinal: Denies nausea, denies vomiting, diarrhea, constipation, abdominal pain, or bloody stools  Genitourinary: denies painful urination, increased frequency, urgency, or bloody urine  Skin: denies rashes or itching  Musculoskeletal: denies muscle aches, joint swelling  Neurologic: Denies generalized weakness, denies loss of sensation, numbness, or tingling      Vital Signs Last 24 Hrs  T(C): 36.7 (16 Mar 2024 05:06), Max: 36.7 (15 Mar 2024 21:07)  T(F): 98.1 (16 Mar 2024 05:06), Max: 98.1 (15 Mar 2024 21:07)  HR: 69 (16 Mar 2024 05:06) (69 - 73)  BP: 157/77 (16 Mar 2024 05:06) (157/77 - 177/71)  BP(mean): --  RR: 18 (16 Mar 2024 05:06) (18 - 18)  SpO2: 93% (16 Mar 2024 05:06) (92% - 93%)    Parameters below as of 16 Mar 2024 05:06  Patient On (Oxygen Delivery Method): room air      PHYSICAL EXAM:    GENERAL: NAD  HEAD:  Atraumatic, Normocephalic  EYES: EOMI, conjunctiva and sclera clear  ENMT: No Drainage from nares, No drainage from ears  NERVOUS SYSTEM:  Awake and Alert  CHEST/LUNG: Clear to percussion bilaterally  EXTREMITIES:  No Edema  SKIN: No rashes No obvious ecchymosis      LABS:                        8.6    4.59  )-----------( 156      ( 16 Mar 2024 06:25 )             26.5     03-15    146<H>  |  112<H>  |  63<H>  ----------------------------<  99  4.4   |  25  |  2.20<H>    Ca    9.0      15 Mar 2024 04:26  Phos  3.6     03-14  Mg     2.0     03-14    TPro  6.5  /  Alb  3.0<L>  /  TBili  0.3  /  DBili  x   /  AST  13<L>  /  ALT  15  /  AlkPhos  56  03-15    PT/INR - ( 14 Mar 2024 23:47 )   PT: 13.8 sec;   INR: 1.19 ratio         PTT - ( 14 Mar 2024 23:47 )  PTT:34.7 sec  Urinalysis Basic - ( 15 Mar 2024 17:00 )    Color: Yellow / Appearance: Clear / S.006 / pH: x  Gluc: x / Ketone: Negative mg/dL  / Bili: Negative / Urobili: 0.2 mg/dL   Blood: x / Protein: Negative mg/dL / Nitrite: Negative   Leuk Esterase: Trace / RBC: 2 /HPF / WBC 8 /HPF   Sq Epi: x / Non Sq Epi: x / Bacteria: x

## 2024-03-16 NOTE — PROGRESS NOTE ADULT - SUBJECTIVE AND OBJECTIVE BOX
Patient is a 83y old  Male who presents with a chief complaint of Palpitations (16 Mar 2024 14:33)      INTERVAL HPI/OVERNIGHT EVENTS: There were no acute overnight events. Patient seen and examined at bedside this AM. He reports chronic costochondral chest wall pain on palpation, but denies palpitations, SOB. Denies N/V. He is anxious and would wants to be discharge as soon as possible.     MEDICATIONS  (STANDING):  amLODIPine   Tablet 10 milliGRAM(s) Oral daily  apixaban 5 milliGRAM(s) Oral two times a day  aspirin  chewable 81 milliGRAM(s) Oral daily  atorvastatin 40 milliGRAM(s) Oral at bedtime  calcitriol   Capsule 0.25 MICROGram(s) Oral daily  carvedilol 25 milliGRAM(s) Oral every 12 hours  digoxin     Tablet 125 MICROGram(s) Oral <User Schedule>  finasteride 5 milliGRAM(s) Oral daily  pantoprazole    Tablet 40 milliGRAM(s) Oral before breakfast  sodium chloride 0.45%. 1000 milliLiter(s) (75 mL/Hr) IV Continuous <Continuous>    MEDICATIONS  (PRN):      Allergies    iodine (Rash)    Intolerances        REVIEW OF SYSTEMS:  CONSTITUTIONAL: No fever or chills  HEENT:  No headache, no sore throat  RESPIRATORY: No cough, wheezing, or shortness of breath  CARDIOVASCULAR: No chest pain, palpitations  GASTROINTESTINAL: No abd pain, nausea, vomiting, or diarrhea  GENITOURINARY: No dysuria, frequency, or hematuria  NEUROLOGICAL: no focal weakness or dizziness, +anxious   MUSCULOSKELETAL: +costochondral pain, no myalgias     Vital Signs Last 24 Hrs  T(C): 36.8 (16 Mar 2024 11:42), Max: 36.8 (16 Mar 2024 11:42)  T(F): 98.2 (16 Mar 2024 11:42), Max: 98.2 (16 Mar 2024 11:42)  HR: 68 (16 Mar 2024 11:42) (68 - 73)  BP: 160/54 (16 Mar 2024 11:42) (157/77 - 177/71)  BP(mean): --  RR: 18 (16 Mar 2024 11:42) (18 - 18)  SpO2: 95% (16 Mar 2024 11:42) (92% - 95%)    Parameters below as of 16 Mar 2024 11:42  Patient On (Oxygen Delivery Method): room air        PHYSICAL EXAM:  GENERAL: NAD  HEENT:  anicteric, moist mucous membranes  CHEST/LUNG:  CTA b/l, no rales, wheezes, or rhonchi  HEART:  RRR, S1, S2  ABDOMEN:  BS+, soft, nontender, nondistended  MUSCULOSKELETAL: +L-sided costochondral tenderness, no edema, cyanosis, or calf tenderness  NEUROLOGIC: answers questions and follows commands appropriately      LABS:                        8.6    4.59  )-----------( 156      ( 16 Mar 2024 06:25 )             26.5     CBC Full  -  ( 16 Mar 2024 06:25 )  WBC Count : 4.59 K/uL  Hemoglobin : 8.6 g/dL  Hematocrit : 26.5 %  Platelet Count - Automated : 156 K/uL  Mean Cell Volume : 86.3 fl  Mean Cell Hemoglobin : 28.0 pg  Mean Cell Hemoglobin Concentration : 32.5 gm/dL  Auto Neutrophil # : 2.74 K/uL  Auto Lymphocyte # : 1.23 K/uL  Auto Monocyte # : 0.52 K/uL  Auto Eosinophil # : 0.07 K/uL  Auto Basophil # : 0.02 K/uL  Auto Neutrophil % : 59.8 %  Auto Lymphocyte % : 26.8 %  Auto Monocyte % : 11.3 %  Auto Eosinophil % : 1.5 %  Auto Basophil % : 0.4 %    16 Mar 2024 06:25    146    |  112    |  49     ----------------------------<  107    3.9     |  24     |  1.80     Ca    9.2        16 Mar 2024 06:25  Phos  3.6       16 Mar 2024 06:25  Mg     2.1       16 Mar 2024 06:25    TPro  6.5    /  Alb  2.9    /  TBili  0.3    /  DBili  x      /  AST  11     /  ALT  15     /  AlkPhos  56     16 Mar 2024 06:25    PT/INR - ( 14 Mar 2024 23:47 )   PT: 13.8 sec;   INR: 1.19 ratio         PTT - ( 14 Mar 2024 23:47 )  PTT:34.7 sec  Urinalysis Basic - ( 16 Mar 2024 06:25 )    Color: x / Appearance: x / SG: x / pH: x  Gluc: 107 mg/dL / Ketone: x  / Bili: x / Urobili: x   Blood: x / Protein: x / Nitrite: x   Leuk Esterase: x / RBC: x / WBC x   Sq Epi: x / Non Sq Epi: x / Bacteria: x      CAPILLARY BLOOD GLUCOSE              RADIOLOGY & ADDITIONAL TESTS:    Personally reviewed.     Consultant(s) Notes Reviewed:  [x] YES  [ ] NO

## 2024-03-16 NOTE — PROGRESS NOTE ADULT - PROBLEM SELECTOR PLAN 4
Likely CKD stage 3  Baseline creatinine possibly ~1.4 -- pt states his Cr had recently fluctuated and was 2.1 couple of week ago but on repeat blood test was back down to 1.4 approx 10 days ago  - Cr 2.2 on admission  - SHARRON on CKD3 -- suspect due to dehydration (reported to nephro he had taken extra lasix and also did not eat/drink as well day of admission)  - Monitor BMP  - FeNa/FeUrea, UA  - Cont  - Avoid nephrotoxic medications as able  - Nephrology (Dr. Shaikh group) following Likely CKD stage 3  - Baseline creatinine possibly ~1.4 -- pt states his Cr had recently fluctuated and was 2.1 couple of week ago but on repeat blood test was back down to 1.4 approx 10 days ago  - Cr 2.2 on admission  - SHARRON on CKD3 -- suspect due to dehydration (reported to nephro he had taken extra lasix and also did not eat/drink as well day of admission)  - Monitor BMP  - FeNa/FeUrea, UA  - Cont home Calcitriol  - Avoid nephrotoxic medications as able  - Nephrology (Dr. Shaikh group) following

## 2024-03-16 NOTE — PROGRESS NOTE ADULT - ASSESSMENT
82 yo w/ PMHX of NSTEMI, RCA stent, LV systolic dysfunction, labile hypertension, PAF, PPM and h/o AAA repair w/ repair in sept 2019, bladdercancer on Keytruda, Hypothyroidism, HLD presents to the ED w/ palpitations that started at 9pm last night. He also admitted to feeling nauseous at the same time he was having palpitations. He presented to the hospital and his HR was noted to be in the 100s. EKG showed ventricular paced with HR of 70 and trops were elevated but ekg showed no ischemic changes and denies chest pain.     In the ED,   EKG: Ventricular Paced      3/16/24  Seen at [facility] [place] telemetry  Lying flat, no complaints offered  PPM checked by [employer] rep with about 3-3 1/2 years left on battery    V-Paced, underlying A-Fib    No V-Tach    check Echocardiogram, prior LVEF 45%  Demand ischemia likely from tachycardia, anemia, CKD  pt denies CP or SOB  pt sees Dr Beck outpt, will need outpt nuclear stress test.  No VT on PPM check, would stop Amiodarone given thyroid abnormality/TSH

## 2024-03-16 NOTE — PROGRESS NOTE ADULT - PROBLEM SELECTOR PLAN 1
Trops mildly elevated and trended to peak, suspected demand ischemia due to palpitations (possibly afib w/ RVR at home)  - S/P ASA 324mg PO x1 in ED  - EKG ventricular paced and no acute ischemic changes. No recorded events on remote telemetry  - Continue ASA 81mg po daily  - c/w lipitor 40mg po QHS  - TTE performed, awaiting official read  - Cardiology (Dr. Beck / Dr. Blevins group) following

## 2024-03-16 NOTE — PROGRESS NOTE ADULT - ATTENDING COMMENTS
Plan as above. Discussed with endo Dr. Perlman, he recommends discontinuing levothyroxine at this time, and having patient follow up outpatient for repeat TFTs including FT4, thyroid US, and consideration of starting prednisone/methimazole. Resident placed call to cardio service, awaiting call back. Discussed cardio Dr. Casillas's recommendations for amio with patient and his wife with no VT seen on interrogation - patient prefers to continue amio for now and discuss this with his outpatient cardio Dr. Beck. Discharge planning.
see below

## 2024-10-26 NOTE — PROGRESS NOTE ADULT - PROBLEM SELECTOR PLAN 2
H/o pAF; EKG ventricular paced and no acute ischemic changes. No longer symptomatic  - Continue home Eliquis 5mg BID   - Cardio recommended holding home Amiodarone 100mg po daily given thyroid disfunction, but patient is hesitant and would like to confirm with his outpatient Cardiologist, Dr. Beck first. Was told this may be an issue in the past.  - Cont Digoxin MWF dosing. Dig level subtherapeutic on admission  - c/w Coreg 25mg po BID  - [facility] PPM interrogation: 3 - 3.5 years left on battery; V-Paced, underlying A-Fib; No V-Tach  - Cardiology (Dr. Beck / Dr. Blevins group) following 26-Oct-2024 22:08